# Patient Record
Sex: FEMALE | Race: WHITE | NOT HISPANIC OR LATINO | Employment: UNEMPLOYED | URBAN - METROPOLITAN AREA
[De-identification: names, ages, dates, MRNs, and addresses within clinical notes are randomized per-mention and may not be internally consistent; named-entity substitution may affect disease eponyms.]

---

## 2020-01-30 DIAGNOSIS — Z12.31 ENCOUNTER FOR SCREENING MAMMOGRAM FOR MALIGNANT NEOPLASM OF BREAST: Primary | ICD-10-CM

## 2020-02-05 ENCOUNTER — HOSPITAL ENCOUNTER (OUTPATIENT)
Dept: RADIOLOGY | Facility: HOSPITAL | Age: 40
Discharge: HOME | End: 2020-02-05
Attending: INTERNAL MEDICINE
Payer: MEDICAID

## 2020-02-05 DIAGNOSIS — Z12.31 ENCOUNTER FOR SCREENING MAMMOGRAM FOR MALIGNANT NEOPLASM OF BREAST: ICD-10-CM

## 2020-02-05 PROCEDURE — 77067 SCR MAMMO BI INCL CAD: CPT | Mod: TC,PO

## 2020-02-07 DIAGNOSIS — R92.8 ABNORMAL MAMMOGRAM: Primary | ICD-10-CM

## 2020-02-12 ENCOUNTER — HOSPITAL ENCOUNTER (OUTPATIENT)
Dept: RADIOLOGY | Facility: HOSPITAL | Age: 40
Discharge: HOME | End: 2020-02-12
Attending: INTERNAL MEDICINE
Payer: MEDICAID

## 2020-02-12 DIAGNOSIS — R92.8 ABNORMAL MAMMOGRAM: ICD-10-CM

## 2020-02-12 PROCEDURE — 76642 ULTRASOUND BREAST LIMITED: CPT | Mod: TC,50,PO

## 2020-06-08 DIAGNOSIS — M54.12 BRACHIAL NEURITIS: ICD-10-CM

## 2020-06-08 DIAGNOSIS — M54.12 RADICULOPATHY, CERVICAL REGION: Primary | ICD-10-CM

## 2020-06-10 ENCOUNTER — CLINICAL SUPPORT (OUTPATIENT)
Dept: REHABILITATION | Facility: HOSPITAL | Age: 40
End: 2020-06-10
Payer: MEDICAID

## 2020-06-10 DIAGNOSIS — M54.16 CHRONIC RADICULAR LUMBAR PAIN: Primary | ICD-10-CM

## 2020-06-10 DIAGNOSIS — G89.29 CHRONIC RADICULAR LUMBAR PAIN: Primary | ICD-10-CM

## 2020-06-10 DIAGNOSIS — M53.3 SI (SACROILIAC) JOINT DYSFUNCTION: ICD-10-CM

## 2020-06-10 DIAGNOSIS — R26.89 IMPAIRED GAIT AND MOBILITY: ICD-10-CM

## 2020-06-10 PROCEDURE — 97162 PT EVAL MOD COMPLEX 30 MIN: CPT

## 2020-06-10 NOTE — PLAN OF CARE
[facility] OUTPATIENT THERAPY AND WELLNESS  Physical Therapy Initial Evaluation    Date: 6/10/2020   Name: Benedicto South  Clinic Number: [id number]    Therapy Diagnosis:   Encounter Diagnoses   Name Primary?    Chronic radicular lumbar pain Yes    SI (sacroiliac) joint dysfunction     Impaired gait and mobility      Physician: Demetrio Ayala MD    Physician Orders: PT Eval and Treat   Medical Diagnosis from Referral: M54.16 Radiculopathy, lumbar region   Evaluation Date: 6/10/2020  Authorization Period Expiration: 6/8/2021  Plan of Care Expiration: 8/5/2020  Visit # / Visits authorized: 1/ 1 (awaiting authorization)  POC: 0/14    Time In: 9:15  Time Out: 10:05  Total Appointment Time (timed & untimed codes): 50 minutes    Precautions: Standard    Subjective   Date of onset: Four years ago my lower back started hurting which slowly progressed down the right leg. There was nothing that I can recall that set off the pain.    History of current condition - BENEDICTO reports: Over the years the pain down my leg has gotten worse to the point where I could not get out of bed for several days. As of now the only thing that calms down the pain in my leg is my gabapentin as I have found that the pain medication does not even touch it. The pain in my back has been constant since then, however I have found that the pain in my leg reduces if I lay on my side with a pillow in between my legs where I am able to keep my hips a little more even.        Medical History:   No past medical history on file.    Surgical History:   Benedicto South had Cervical laminectomy in 2016    Medications:   Benedicto currently is taking Gabapentin, Ibprophen 800mg    Allergies:   Review of patient's allergies indicates:  Allergies not on file     Imaging, MRI studies: Posterior Buldging discs at L2-L4    Prior Therapy: Therapy 4 years ago four years ago however it did not feel like it made much of a difference  Social History: Lives with her two  children in a one story home. Enjoys cooking  Occupation: Unemployed  Prior Level of Function: Able to ambulate, tolerate standing and perform daily tasks without difficulty and independently.  Current Level of Function: I am unable to lift things, standing or walking long distances. Leaning forwards over the sink gives me relief but I have to sit intermediate between washing dishes.   I have to really rely on a railing if I am going up and down stairs.    Pain:  Current 9/10, worst 10/10, best 5/10   Location: right back , lower legs and upper legs   Description: Aching, Sharp and Shooting  Aggravating Factors: Standing, Walking and Flexing  Easing Factors: pain medication    Pts goals: Just to feel better    Objective     Structural/Postural Inspection: Significant forward head and rounded shoulder posture with lumbar lordosis with unsupported sitting.     Palpation for Condition: Tenderness to palpation with a PA mobilization from L5 to L2, bilateral lumbar perispinal musculature, and right gluteus medius/ david    Repeated Movements:     Repeated Movements/Direction Specific Results   Standing Extension  Positive reproduction/ radicular symptoms of pain   Seated  Flexion  Reduction/ retraction of symptoms   Beginning Levels of pain:        Low Back pain: 8/10,   Thigh pain 3/10,  Calf Pain 3/10    Post Standing extension:        Low Back pain: 9/10,   Thigh Pain  5/10, Calf Pain    5/10    Post Seated Flexion:         Low Back pain: 7/10,   Thigh Pain 4/10,  Calf Pain  4/10    Lumbar Special Test:     Lumbar Special Test  Results Comments   Lumbar Distraction  Positive. Reduction of symptoms       SI Tests/Screen:    Sacroiliac Results Comments   SI Distraction * Positive.    Gaenslen's* Positive.    Supine to sit test  Positive.    FABERs Positive.        LE MMT Testing:  Assess at next treatment session     Muscle Length Tension Testing:     Lumbar/Hip/Knee Right  Left  Comments   Jose Test To be assessed next  session To be assessed    Monty's Test Positive  Negative Production of pain in the hip and knee     Hip Screen:  Assess at next treatment session  (ROM for ER and IR)       Hip Right Left   FADDIR Test Negative. Negative.   Scour Test Negative. Negative.     Sensation:Lack of light and deep touch sensation medial two toes RLE to proximal phalanx. All joint proprioception intact    Neuro Testing Right  Left Comments   SLR (Sciatic) Positive. Positive. Right leg positive at 35 degrees hip flexion  Left leg positive at 60 degrees hip flexion     Reflexes Positive Right  Positive Left Comments   Patellar (L3-L4) 2+ 2+    Achilles (S1) 2+ 2+      Outcome Measures:     Oswestry Disability Index: 31/50 = 62% Disability  Limited mostly by: Pain intensity, Unable to tolerate standing longer than 10 minutes, only doing light duties around the house, unable to tolerate sitting for longer than 1 hour          TREATMENT     Home Exercises and Patient Education Provided    Education provided:   - Provided with a simple exercise program listed in patient instructions.   - Instructed to perform exercises 2x a day to tolerance  - Educated on the POC and the positive findings during her evaluation. Instructed in the roll of the sacrum, pelvis and lumbar spine to provide stability during upright positions    Written Home Exercises Provided: yes.  Exercises were reviewed and BENEDICTO was able to demonstrate them prior to the end of the session.  BENEDICTO demonstrated good  understanding of the education provided.     See EMR under Patient Instructions for exercises provided 6/10/2020.    Assessment   Benedicto is a 39 y.o. female referred to outpatient Physical Therapy with a medical diagnosis of M54.16 Radiculopathy, lumbar region. Pt presents with right sided radicular symptoms that respond to flexion directional specific repeitions, sciatic nerve flossing and pelvic compression. In addition to her reported herniated lumbar discs she also  demonstrates SI dysfunction and sciatic nerve pain with increased muscle tension/ guarding throughout her lumbar perispinal and gluteus medius/ david. Monica is currently acutely inflamed throughout the lumbar spine and is unable to tolerate much overpressure or stress. Monica is currently limited 62% of her lumbar spine function which is limiting her ability to perform daily tasks such as standing long enough to wash dishes or light household tasks. Given her reduction of symptoms and positive findings on the above tests she would benefit from therapy.    Pt prognosis is Fair.   Pt will benefit from skilled outpatient Physical Therapy to address the deficits stated above and in the chart below, provide pt/family education, and to maximize pt's level of independence.     Plan of care discussed with patient: Yes  Pt's spiritual, cultural and educational needs considered and patient is agreeable to the plan of care and goals as stated below:     Anticipated Barriers for therapy: Long duration of current problem list, high levels of pain    Medical Necessity is demonstrated by the following  History  Co-morbidities and personal factors that may impact the plan of care Co-morbidities:   anxiety, difficulty sleeping and high BMI    Personal Factors:   social background  lifestyle  attitudes     moderate   Examination  Body Structures and Functions, activity limitations and participation restrictions that may impact the plan of care Body Regions:   back  lower extremities  trunk    Body Systems:    ROM  strength  gross coordinated movement  gait  transfers  motor control  motor learning    Participation Restrictions:   High levels of pain    Activity limitations:   Learning and applying knowledge  no deficits    General Tasks and Commands  no deficits    Communication  no deficits    Mobility  lifting and carrying objects  walking  using transportation (bus, train, plane, car)  driving (bike, car, motorcycle)    Self  care  washing oneself (bathing, drying, washing hands)  caring for body parts (brushing teeth, shaving, grooming)    Domestic Life  shopping  cooking  doing house work (cleaning house, washing dishes, laundry)  assisting others    Interactions/Relationships  no deficits    Life Areas  employment    Community and Social Life  community life  recreation and leisure         moderate   Clinical Presentation evolving clinical presentation with changing clinical characteristics moderate   Decision Making/ Complexity Score: moderate     Goals:  Short Term Goals: 4 weeks   1. Patient will verbalize a reduction of right radicular symptoms to the knee to improve sensation of the right leg during ambulation.  2. Patient will report a MARK score of 40% disability to improve her QoL with daily tasks.  3. Patient will tolerate standing for 30 minutes without increasing levels of pain to allow her to wash the dishes without having to take a rest break in between each.   4. Patient will tolerate sitting for 2 hours without an increase of pain to allow her to travel moderate distances without difficulty.    Long Term Goals: 8 weeks   1. Patient will report a MARK score of 15% disability to improve her QoL with daily tasks.  2. Patient will tolerate standing for 50 minutes without exacerbation of symptoms so she can walk around the grocery store without having to compensate by leaning on the grocery cart.  3. Patient will verbalized a reduction of right radicular symptoms to the mid thigh to improve her sensation of the right leg during ambulation.     Plan   Plan of care Certification: 6/10/2020 to 8/5/2020.    Outpatient Physical Therapy 2 times weekly for 4 weeks, followed by 1 time a week for 6 weeks to include the following interventions: Electrical Stimulation IFC, Manual Therapy, Moist Heat/ Ice, Neuromuscular Re-ed, Patient Education, Therapeutic Activites and Therapeutic Exercise.     Emily Choudhury, PT

## 2020-06-20 NOTE — PROGRESS NOTES
"[facility] OUTPATIENT  Physical Therapy Daily Treatment Note     Name: Benedicto South  Clinic Number: [id number]    Therapy Diagnosis: No diagnosis found.  Physician: No ref. provider found    Visit Date: 6/23/2020    Physician Orders: PT Eval and Treat   Medical Diagnosis from Referral: M54.16 Radiculopathy, lumbar region     Evaluation Date: 6/10/2020  Authorization Period Expiration: 6/8/2021  Plan of Care Expiration: 8/5/2020    Visit # / Visits authorized: 1/ 1 (awaiting authorization)  (Freq: Ev + 2w4,1w6)    POC: 0/14    Total Visit count: 2    PTA Visit: 0/5      Re-assessment due by: 7/10/2020      Time In: 0900  Time Out: 0950      Precautions: Standard      Subjective     Pt reports: She currently relates an 8/10 pain radiating pain to her R LE.  She adds that she unable to perform her PPT on HEP due to a "female issue" and an inability to contract her abdominals.        She was compliant with home exercise program.    Response to previous treatment: First visit following eval.  Functional change: Non-remarkable      Pain: 8/10  Location: right back , feet , lower legs and upper legs       Objective       BENEDICTO received therapeutic exercises to develop strength, endurance, ROM, flexibility and core stabilization for 42 minutes including:    · NuStep L2 x 5 min  · Sciatic nerve glides R LE 10 x   · DKTC with swissball 10 x 2  · LTR with swissball 10 x 2  · PPT with TrA and swissball 10 x 2  · SKTC in sitting 10 x   · Seated prayer stretch with swissball 3 positions 30 sec each.    ·         BENEDICTO received the following direct contact modalities after being cleared for contraindications:N/A    BENEDICTO received the following supervised modalities after being cleared for contradictions: IFC Electrical Stimulation:  Benedicto received IFC Electrical Stimulation for pain control applied to the R LB. Pt received stimulation with MHP for 9 minutes. Benedicto tolderated treatment well " without any adverse effects.        BENEDICTO received hot pack for as above minutes to as above.    BENEDICTO received cold pack for 0 minutes to N/A.      Home Exercises Provided and Patient Education Provided     Education provided:   - Patient was educated on adjusted HEP and educated on mode frequency reps, and sets as well as when to stop TE.  Patient verbalzied understanding, performed return demonstration and with no adverse affects noted nor verbalized.        Written Home Exercises Provided: Patient instructed to cont prior HEP. As adjusted with seated SKTC and DKTC  Exercises were reviewed and BENEDICTO was able to demonstrate them prior to the end of the session.  BENEDICTO demonstrated fair  understanding of the education provided.     See EMR under Media for exercises provided prior visit.    Assessment      Patient participated with PT to address her current deficits regarding pain, weakness and function.  Her HEP was adjusted for improved compliance and ability to perform.  She was able to aquilino her Rx this day with education, VC/TC for improved exs quality and performance and without c/o.  She is expected to improve andprogress with cont PT RX in order to strengthen her core and hip musculature in order to optimize her safe functional mob.      BENEDICTO is progressing well towards her goals.   Pt prognosis is Fair.     Pt will continue to benefit from skilled outpatient physical therapy to address the deficits listed in the problem list box on initial evaluation, provide pt/family education and to maximize pt's level of independence in the home and community environment.     Pt's spiritual, cultural and educational needs considered and pt agreeable to plan of care and goals.       Anticipated barriers to physical therapy: Long duration of current problem list, high levels of pain      Goals:     Short Term Goals: 4 weeks   1. Patient will verbalize a reduction of right radicular symptoms to the knee to improve sensation of  the right leg during ambulation.   In progress 6/23/2020  2. Patient will report a MARK score of 40% disability to improve her QoL with daily tasks.         In progress 6/23/2020  3. Patient will tolerate standing for 30 minutes without increasing levels of pain to allow her to wash the dishes without having to take a rest break in between each.   4. Patient will tolerate sitting for 2 hours without an increase of pain to allow her to travel moderate distances without difficulty.     Long Term Goals: 8 weeks   1. Patient will report a MARK score of 15% disability to improve her QoL with daily tasks.  2. Patient will tolerate standing for 50 minutes without exacerbation of symptoms so she can walk around the grocery store without having to compensate by leaning on the grocery cart.  3. Patient will verbalized a reduction of right radicular symptoms to the mid thigh to improve her sensation of the right leg during ambulation      Plan     Cont POC and follow up on updated HEP and progress as aquilino.          Patricio Lipscomb, PT

## 2020-06-23 ENCOUNTER — CLINICAL SUPPORT (OUTPATIENT)
Dept: REHABILITATION | Facility: HOSPITAL | Age: 40
End: 2020-06-23
Payer: MEDICAID

## 2020-06-23 DIAGNOSIS — M53.3 SI (SACROILIAC) JOINT DYSFUNCTION: ICD-10-CM

## 2020-06-23 DIAGNOSIS — G89.29 CHRONIC RADICULAR LUMBAR PAIN: Primary | ICD-10-CM

## 2020-06-23 DIAGNOSIS — R26.89 IMPAIRED GAIT AND MOBILITY: ICD-10-CM

## 2020-06-23 DIAGNOSIS — M54.16 CHRONIC RADICULAR LUMBAR PAIN: Primary | ICD-10-CM

## 2020-06-23 PROCEDURE — 97110 THERAPEUTIC EXERCISES: CPT

## 2020-06-25 ENCOUNTER — CLINICAL SUPPORT (OUTPATIENT)
Dept: REHABILITATION | Facility: HOSPITAL | Age: 40
End: 2020-06-25
Payer: MEDICAID

## 2020-06-25 DIAGNOSIS — M53.3 SI (SACROILIAC) JOINT DYSFUNCTION: ICD-10-CM

## 2020-06-25 DIAGNOSIS — M54.16 CHRONIC RADICULAR LUMBAR PAIN: ICD-10-CM

## 2020-06-25 DIAGNOSIS — R26.89 IMPAIRED GAIT AND MOBILITY: ICD-10-CM

## 2020-06-25 DIAGNOSIS — G89.29 CHRONIC RADICULAR LUMBAR PAIN: ICD-10-CM

## 2020-06-25 PROCEDURE — 97110 THERAPEUTIC EXERCISES: CPT | Mod: CQ

## 2020-06-25 NOTE — PROGRESS NOTES
[facility] OUTPATIENT  Physical Therapy Daily Treatment Note     Name: Benedicto South  Clinic Number: [id number]    Therapy Diagnosis:   Encounter Diagnoses   Name Primary?    Chronic radicular lumbar pain     SI (sacroiliac) joint dysfunction     Impaired gait and mobility      Physician: No ref. provider found    Visit Date: 6/25/2020    Physician Orders: PT Eval and Treat   Medical Diagnosis from Referral: M54.16 Radiculopathy, lumbar region     Evaluation Date: 6/10/2020  Authorization Period Expiration: 6/8/2021  Plan of Care Expiration: 8/5/2020    Visit # / Visits authorized: 3/ 13  (Freq: Ev + 2w4,1w6)    POC: 3/14    Total Visit count: 3    PTA Visit: 1/5      Re-assessment due by: 7/10/2020      Time In: 0915  Time Out: 1015      Precautions: Standard      Subjective     Pt reports: current pain level 5-6/10 with constant numbness in R toes.        She was not compliant with home exercise program.    Response to previous treatment: no soreness   Functional change: N/a      Pain: 5/10  Location: right back , feet , lower legs and upper legs       Objective       BENEDICTO received therapeutic exercises to develop strength, endurance, ROM, flexibility and core stabilization for 42 minutes including:    · NuStep L2 x 5 min  · Hamstring stretch 30 x 3   · Piriformis stretch seated 30 x 3   · Sciatic nerve glides B LE 10 x 2   · DKTC with swissball 10 x 2  · LTR with swissball 10 x 2  · PPT with TrA and swissball 10 x 2  · SKTC in sitting 10 x   · Seated prayer stretch with swissball 3 positions 30 sec each.    ·         BENEDICTO received the following direct contact modalities after being cleared for contraindications:N/A    BENEDICTO received the following supervised modalities after being cleared for contradictions: Mechanical Traction:  Benedicto received intermittent mechanical traction to the lumbar spine at a force of 120 pounds hold with 60 pound rest for a total of 15 minutes.  Patient tolerated treatment well without any adverse effects.      BENEDICTO received the following supervised modalities after being cleared for contradictions: IFC Electrical Stimulation:  Benedicto received IFC Electrical Stimulation for pain control applied to the R LB. Pt received stimulation with MHP for  minutes. Benedicto tolderated treatment well without any adverse effects.        BENEDICTO received hot pack for as above minutes to as above.    BENEDICTO received cold pack for 0 minutes to N/A.      Home Exercises Provided and Patient Education Provided     Education provided:   - Patient was educated on adjusted HEP and educated on mode frequency reps, and sets as well as when to stop TE.  Patient verbalzied understanding, performed return demonstration and with no adverse affects noted nor verbalized.        Written Home Exercises Provided: Patient instructed to cont prior HEP. As adjusted with seated SKTC and DKTC  Exercises were reviewed and BENEDICTO was able to demonstrate them prior to the end of the session.  BENEDICTO demonstrated fair  understanding of the education provided.     See EMR under Media for exercises provided prior visit.    Assessment      Patient was able to perform all recommended TE without increase of pain or onset of radicular symptoms. She tolerated the addition of HHS and piriformis stretch without incident. Mechanical traction was added to treatment today to address sciatica symptoms. She responded well to traction with reports of improved ability to move her legs following traction.       BENEDICTO is progressing well towards her goals.   Pt prognosis is Fair.     Pt will continue to benefit from skilled outpatient physical therapy to address the deficits listed in the problem list box on initial evaluation, provide pt/family education and to maximize pt's level of independence in the home and community environment.     Pt's spiritual, cultural and educational needs considered and pt agreeable to plan of care and  goals.       Anticipated barriers to physical therapy: Long duration of current problem list, high levels of pain      Goals:     Short Term Goals: 4 weeks   1. Patient will verbalize a reduction of right radicular symptoms to the knee to improve sensation of the right leg during ambulation.   In progress 6/25/2020  2. Patient will report a MARK score of 40% disability to improve her QoL with daily tasks.         In progress 6/25/2020  3. Patient will tolerate standing for 30 minutes without increasing levels of pain to allow her to wash the dishes without having to take a rest break in between each.   4. Patient will tolerate sitting for 2 hours without an increase of pain to allow her to travel moderate distances without difficulty.     Long Term Goals: 8 weeks   1. Patient will report a MARK score of 15% disability to improve her QoL with daily tasks.  2. Patient will tolerate standing for 50 minutes without exacerbation of symptoms so she can walk around the grocery store without having to compensate by leaning on the grocery cart.  3. Patient will verbalized a reduction of right radicular symptoms to the mid thigh to improve her sensation of the right leg during ambulation      Plan     Cont POC and follow up on updated HEP and progress as aquilino.          Kayla Tomas, PTA

## 2020-06-27 NOTE — PROGRESS NOTES
"                               [facility] OUTPATIENT  Physical Therapy Daily Treatment Note     Name: Benedicto South  Clinic Number: [id number]    Therapy Diagnosis:   No diagnosis found.  Physician: Demetrio Ayala MD    Visit Date: 6/30/2020    Physician Orders: PT Eval and Treat   Medical Diagnosis from Referral: M54.16 Radiculopathy, lumbar region     Evaluation Date: 6/10/2020  Authorization Period Expiration: 6/8/2021  Plan of Care Expiration: 8/5/2020    Visit # / Visits authorized: 4/ 13  (Freq: Ev + 2w4,1w6)    POC: 4/14    Total Visit count: 4    PTA Visit: 0/5      Re-assessment due by: 7/10/2020      Time In: 0955  Time Out: 1040      Precautions: Standard      Subjective     Pt reports: current pain level 4/10 with constant numbness in R LE to toes, "its not as painful today" "I think the traction helped, I liked it."      She was not compliant with home exercise program.    Response to previous treatment: no soreness   Functional change: N/a      Pain: 5/10  Location: right back , feet , lower legs and upper legs       Objective       BENEDICTO received therapeutic exercises to develop strength, endurance, ROM, flexibility and core stabilization for 32 minutes including:    · NuStep L2 x 5 min - held  · Hamstring stretch 30 x 3   · Piriformis stretch seated 30 x 3   · Sciatic nerve glides B LE 10 x 2   · DKTC with swissball 10 x 2  · LTR with swissball 10 x 2  · PPT with TrA and swissball 10 x 2 held  · SKTC in sitting 10 x held  · Seated prayer stretch with swissball 3 positions 30 sec each.  -held  ·         BENEDICTO received the following direct contact modalities after being cleared for contraindications:N/A    BENEDICTO received the following supervised modalities after being cleared for contradictions: Mechanical Traction:  Benedicto received intermittent mechanical traction to the lumbar spine at a force of 120 pounds hold for 30 seconds with 70 pound rest for 10 sec. for a total of 15 minutes. " Patient tolerated treatment well without any adverse effects.  Patient related a pain reduction to a 1/10 following traction.        BENEDICTO received the following supervised modalities after being cleared for contradictions: IFC Electrical Stimulation:  Benedicto received IFC Electrical Stimulation for pain control applied to the R LB. Pt received stimulation with MHP for  0 minutes. Benedicto tolderated treatment well without any adverse effects.        BENEDICTO received hot pack for as above minutes to as above.    BENEDICTO received cold pack for 0 minutes to N/A.      Home Exercises Provided and Patient Education Provided     Education provided:   - Patient wasi ssued and educated on HEP and educated on mode frequency reps, and sets as well as when to stop TE.  Patient verbalzied understanding, performed return demonstration and with no adverse affects noted nor verbalized.        Written Home Exercises Provided: Issued updated HEP.   Exercises were reviewed and BENEDICTO was able to demonstrate them prior to the end of the session.  BENEDICTO demonstrated fair  understanding of the education provided.     See EMR under Media for exercises provided 6/30/2020.    Assessment     Patient participated with PT to address her deficits including her radicular pain.  She was able to aquilino her TE without c/o but did require VC/TC for improved exs quality and performance with with noted nn stretches.  She received lumbar mechanical traction as noted and responded well with a reduction in pain.  She was issued HEP as noted and is expected to improve and progress to her established goals with cont PT RX.         BENEDICTO is progressing well towards her goals.   Pt prognosis is Fair.     Pt will continue to benefit from skilled outpatient physical therapy to address the deficits listed in the problem list box on initial evaluation, provide pt/family education and to maximize pt's level of independence in the home and community environment.     Pt's spiritual,  cultural and educational needs considered and pt agreeable to plan of care and goals.       Anticipated barriers to physical therapy: Long duration of current problem list, high levels of pain      Goals:     Short Term Goals: 4 weeks   1. Patient will verbalize a reduction of right radicular symptoms to the knee to improve sensation of the right leg during ambulation.   In progress 6/27/2020  2. Patient will report a MARK score of 40% disability to improve her QoL with daily tasks.         In progress 6/27/2020  3. Patient will tolerate standing for 30 minutes without increasing levels of pain to allow her to wash the dishes without having to take a rest break in between each.   4. Patient will tolerate sitting for 2 hours without an increase of pain to allow her to travel moderate distances without difficulty.     Long Term Goals: 8 weeks   1. Patient will report a MARK score of 15% disability to improve her QoL with daily tasks.  2. Patient will tolerate standing for 50 minutes without exacerbation of symptoms so she can walk around the grocery store without having to compensate by leaning on the grocery cart.  3. Patient will verbalized a reduction of right radicular symptoms to the mid thigh to improve her sensation of the right leg during ambulation      Plan       Cont POC, reinforce HEP and patient is to have an endometrial ablation and will have a short interruption in her Rx schedule.          Patricio Lipscomb, PT

## 2020-06-30 ENCOUNTER — DOCUMENTATION ONLY (OUTPATIENT)
Dept: REHABILITATION | Facility: HOSPITAL | Age: 40
End: 2020-06-30

## 2020-06-30 ENCOUNTER — CLINICAL SUPPORT (OUTPATIENT)
Dept: REHABILITATION | Facility: HOSPITAL | Age: 40
End: 2020-06-30
Payer: MEDICAID

## 2020-06-30 DIAGNOSIS — R26.89 IMPAIRED GAIT AND MOBILITY: ICD-10-CM

## 2020-06-30 DIAGNOSIS — M53.3 SI (SACROILIAC) JOINT DYSFUNCTION: ICD-10-CM

## 2020-06-30 DIAGNOSIS — G89.29 CHRONIC RADICULAR LUMBAR PAIN: Primary | ICD-10-CM

## 2020-06-30 DIAGNOSIS — M54.16 CHRONIC RADICULAR LUMBAR PAIN: Primary | ICD-10-CM

## 2020-06-30 PROCEDURE — 97110 THERAPEUTIC EXERCISES: CPT

## 2020-06-30 PROCEDURE — 97012 MECHANICAL TRACTION THERAPY: CPT

## 2020-07-16 ENCOUNTER — CLINICAL SUPPORT (OUTPATIENT)
Dept: REHABILITATION | Facility: HOSPITAL | Age: 40
End: 2020-07-16
Payer: MEDICAID

## 2020-07-16 DIAGNOSIS — M53.3 SI (SACROILIAC) JOINT DYSFUNCTION: ICD-10-CM

## 2020-07-16 DIAGNOSIS — G89.29 CHRONIC RADICULAR LUMBAR PAIN: Primary | ICD-10-CM

## 2020-07-16 DIAGNOSIS — M54.16 CHRONIC RADICULAR LUMBAR PAIN: Primary | ICD-10-CM

## 2020-07-16 DIAGNOSIS — R26.89 IMPAIRED GAIT AND MOBILITY: ICD-10-CM

## 2020-07-16 PROCEDURE — 97110 THERAPEUTIC EXERCISES: CPT

## 2020-07-16 NOTE — PROGRESS NOTES
[facility] OUTPATIENT  Physical Therapy Daily Treatment Note/ Reassessment     Name: Benedicto South  Clinic Number: [id number]    Therapy Diagnosis:   No diagnosis found.  Physician: Demetrio Ayala MD    Visit Date: 7/16/2020    Physician Orders: PT Eval and Treat   Medical Diagnosis from Referral: M54.16 Radiculopathy, lumbar region     Evaluation Date: 6/10/2020  Authorization Period Expiration: 6/8/2021  Plan of Care Expiration: 8/5/2020    Visit # / Visits authorized: 5/ 13  (Freq: Ev + 2w4,1w6)      Total Visit count: 5    PTA Visit: 0/5      Re-assessment due by: 8/16/2020      Time In: 1045  Time Out: 1130      Precautions: Standard      Subjective     Pt reports: current pain level 8/10 to her R LE to toes.  She adds that she had her procedure last week and has not performed her HEP in about a week.  Moreover, she related that she felt that before her procedure she had made improvements but after stopping she may have returned toward her initial symptoms.  She adds that she can perform standing for about 10 minutes       She was not compliant with home exercise program.    Response to previous treatment: no soreness   Functional change: N/a      Pain: 5/10  Location: right back , feet , lower legs and upper legs       Objective     BENEDICTO received therapeutic exercises to develop strength, endurance, ROM, flexibility and core stabilization for 40 minutes including:    · NuStep L3 x 6 min   · Hamstring stretch 60 sec x 2  · Piriformis stretch seated 60 sec x 2   · Sciatic nerve glides B LE 10 x 2   · DKTC with swissball 10 x 2  · LTR with swissball 10 x 2 (one set today)  · PPT with TrA and swissball 10 x 2 held  · SKTC in sitting 10 x held  · Seated prayer stretch with swissball 3 positions 30 sec each.  -held  ·       Functional Test:     Mod I: 39/50 = 78%      BENEDICTO received the following direct contact modalities after being cleared for  contraindications:N/A    BENEDICTO received the following supervised modalities after being cleared for contradictions: Mechanical Traction:  Benedicto received intermittent mechanical traction to the lumbar spine at a force of 120 pounds hold for 30 seconds with 70 pound rest for 10 sec. for a total of 0 minutes. Patient tolerated treatment well without any adverse effects.  Patient related a pain reduction to a 1/10 following traction.        BENEDICTO received the following supervised modalities after being cleared for contradictions: IFC Electrical Stimulation:  Benedicto received IFC Electrical Stimulation for pain control applied to the R LB. Pt received stimulation with MHP for  0 minutes. Benedicto tolderated treatment well without any adverse effects.        BENEDICTO received hot pack for as above minutes to as above.    BENEDICTO received cold pack for 0 minutes to N/A.      Home Exercises Provided and Patient Education Provided     Education provided:   - Patient was educated on gradual reintegration of HEP as well as when to stop TE.  Patient verbalzied understanding, performed return demonstration and with no adverse affects noted nor verbalized.        Written Home Exercises Provided: Issued updated HEP.   Exercises were reviewed and BENEDICTO was able to demonstrate them prior to the end of the session.  BENEDICTO demonstrated fair  understanding of the education provided.     See EMR under Media for exercises provided 6/30/2020.    ReAssessment     Patient has participated with PT to address her pain, and function.  She had initially had improvements with pain mitigation and improving function but most recently had to have a procedure and has migrated toward her original state after about a week of inactivity.  She currently relates a disability of 78% as per the MOD I and with STG 1, 2, and 4 in progress.  Patient is expected to improve and progress with cont PT Rx to address her core and lumbar stabilization in order to mitigate and  centralize her pain.  She is expected to progress to her established goals with cont PT Rx.           BENEDICTO is progressing well towards her goals.   Pt prognosis is Fair.     Pt will continue to benefit from skilled outpatient physical therapy to address the deficits listed in the problem list box on initial evaluation, provide pt/family education and to maximize pt's level of independence in the home and community environment.     Pt's spiritual, cultural and educational needs considered and pt agreeable to plan of care and goals.       Anticipated barriers to physical therapy: Long duration of current problem list, high levels of pain      Goals:     Short Term Goals: 4 weeks   1. Patient will verbalize a reduction of right radicular symptoms to the knee to improve sensation of the right leg during ambulation.   In progress 7/16/2020  2. Patient will report a MARK score of 40% disability to improve her QoL with daily tasks.         In progress 7/16/2020  3. Patient will tolerate standing for 30 minutes without increasing levels of pain to allow her to wash the dishes without having to take a rest break in between each.   4. Patient will tolerate sitting for 2 hours without an increase of pain to allow her to travel moderate distances without difficulty.  In progress 7/16/2020     Long Term Goals: 8 weeks   1. Patient will report a MARK score of 15% disability to improve her QoL with daily tasks.  2. Patient will tolerate standing for 50 minutes without exacerbation of symptoms so she can walk around the grocery store without having to compensate by leaning on the grocery cart.  3. Patient will verbalized a reduction of right radicular symptoms to the mid thigh to improve her sensation of the right leg during ambulation      Plan       Cont POC, reinforce return to HEP and progress as aquilino.          Patricio Lipscomb, PT

## 2020-07-22 NOTE — PROGRESS NOTES
[facility] OUTPATIENT  Physical Therapy Daily Treatment Note     Name: Benedicto South  Clinic Number: [id number]    Therapy Diagnosis:   Encounter Diagnoses   Name Primary?    Chronic radicular lumbar pain Yes    SI (sacroiliac) joint dysfunction     Impaired gait and mobility      Physician: Demetrio Ayala MD    Visit Date: 7/23/2020    Physician Orders: PT Eval and Treat   Medical Diagnosis from Referral: M54.16 Radiculopathy, lumbar region     Evaluation Date: 6/10/2020  Authorization Period Expiration: 6/8/2021  Plan of Care Expiration: 8/5/2020    Visit # / Visits authorized: 6/ 13  (Freq: Ev + 2w4,1w6)      Total Visit count: 6    PTA Visit: 0/5      Re-assessment due by: 8/16/2020      Time In: 1045  Time Out: 1135      Precautions: Standard      Subjective     Pt reports: current discomfort/pain level 5/10 to her R LE   She relates she is still a little tender from her procedure and that she has follow up with her MD today.      She was not compliant with home exercise program.    Response to previous treatment: no soreness   Functional change: N/A      Pain: 5/10  Location: right back , feet , lower legs and upper legs       Objective     BENEDICTO received therapeutic exercises to develop strength, endurance, ROM, flexibility and core stabilization for 42 minutes including:    · NuStep L3 x 6 min   · Hamstring stretch 60 sec x 2  · Piriformis stretch seated 60 sec x 2   · Sciatic nerve glides B LE 10 x 2   · DKTC with swissball 10 x 2  · LTR with swissball 10 x 2   · PPT with TrA and swissball 10 x 2  · SKTC in sitting 10 x held  · Seated prayer stretch with swissball 3 positions 30 sec each.   ·         BENEDICTO received the following direct contact modalities after being cleared for contraindications:N/A    BENEDICTO received the following supervised modalities after being cleared for contradictions: Mechanical Traction:  Benedicto received intermittent mechanical traction  to the lumbar spine at a force of 120 pounds hold for 30 seconds with 70 pound rest for 10 sec. for a total of 0 minutes. Patient tolerated treatment well without any adverse effects.  Patient related a pain reduction to a 1/10 following traction.        BENEDICTO received the following supervised modalities after being cleared for contradictions: IFC Electrical Stimulation:  Benedicto received IFC Electrical Stimulation for pain control applied to the R LB. Pt received stimulation with MHP for  0 minutes. Benedicto tolderated treatment well without any adverse effects.        BENEDICTO received hot pack for as above minutes to as above.    BENEDICTO received cold pack for 0 minutes to N/A.      Home Exercises Provided and Patient Education Provided     Education provided:   - Patient was educated on gradual reintegration of HEP as well as when to stop TE.  Patient verbalzied understanding, performed return demonstration and with no adverse affects noted nor verbalized.        Written Home Exercises Provided: Issued updated HEP.   Exercises were reviewed and BENEDICTO was able to demonstrate them prior to the end of the session.  BENEDICTO demonstrated fair  understanding of the education provided.     See EMR under Media for exercises provided 6/30/2020.    Assessment     Patient has participated with PT to address her pain, ROM/flexibility core strength activity aquilino and functional mob.  She was able to aquilino her Rx this day better than last time but cont to have c/o of abdominal discomfort post endometrial ablation.  She required cueing for improved technique and core activation.  Moreover she did require several recovery periods due to fatigue but was able to recover appropriately.  She is expected to improve and progress with cont PT Rx in order to strengthen her core.         BENEDICTO is progressing well towards her goals.   Pt prognosis is Fair.     Pt will continue to benefit from skilled outpatient physical therapy to address the deficits listed in  the problem list box on initial evaluation, provide pt/family education and to maximize pt's level of independence in the home and community environment.     Pt's spiritual, cultural and educational needs considered and pt agreeable to plan of care and goals.       Anticipated barriers to physical therapy: Long duration of current problem list, high levels of pain      Goals:     Short Term Goals: 4 weeks   1. Patient will verbalize a reduction of right radicular symptoms to the knee to improve sensation of the right leg during ambulation.   In progress 7/23/2020  2. Patient will report a MARK score of 40% disability to improve her QoL with daily tasks.         In progress 7/23/2020  3. Patient will tolerate standing for 30 minutes without increasing levels of pain to allow her to wash the dishes without having to take a rest break in between each.   4. Patient will tolerate sitting for 2 hours without an increase of pain to allow her to travel moderate distances without difficulty.  In progress 7/23/2020     Long Term Goals: 8 weeks   1. Patient will report a MARK score of 15% disability to improve her QoL with daily tasks.  2. Patient will tolerate standing for 50 minutes without exacerbation of symptoms so she can walk around the grocery store without having to compensate by leaning on the grocery cart.  3. Patient will verbalized a reduction of right radicular symptoms to the mid thigh to improve her sensation of the right leg during ambulation      Plan       Cont POC, progress as aquilino.          Patricio Lipscomb, PT

## 2020-07-23 ENCOUNTER — CLINICAL SUPPORT (OUTPATIENT)
Dept: REHABILITATION | Facility: HOSPITAL | Age: 40
End: 2020-07-23
Payer: MEDICAID

## 2020-07-23 DIAGNOSIS — M53.3 SI (SACROILIAC) JOINT DYSFUNCTION: ICD-10-CM

## 2020-07-23 DIAGNOSIS — M54.16 CHRONIC RADICULAR LUMBAR PAIN: Primary | ICD-10-CM

## 2020-07-23 DIAGNOSIS — G89.29 CHRONIC RADICULAR LUMBAR PAIN: Primary | ICD-10-CM

## 2020-07-23 DIAGNOSIS — R26.89 IMPAIRED GAIT AND MOBILITY: ICD-10-CM

## 2020-07-23 PROCEDURE — 97110 THERAPEUTIC EXERCISES: CPT

## 2020-07-30 ENCOUNTER — CLINICAL SUPPORT (OUTPATIENT)
Dept: REHABILITATION | Facility: HOSPITAL | Age: 40
End: 2020-07-30
Payer: MEDICAID

## 2020-07-30 DIAGNOSIS — G89.29 CHRONIC RADICULAR LUMBAR PAIN: ICD-10-CM

## 2020-07-30 DIAGNOSIS — M54.16 CHRONIC RADICULAR LUMBAR PAIN: ICD-10-CM

## 2020-07-30 DIAGNOSIS — R26.89 IMPAIRED GAIT AND MOBILITY: ICD-10-CM

## 2020-07-30 DIAGNOSIS — M53.3 SI (SACROILIAC) JOINT DYSFUNCTION: ICD-10-CM

## 2020-07-30 PROCEDURE — 97110 THERAPEUTIC EXERCISES: CPT

## 2020-07-30 NOTE — PROGRESS NOTES
[facility] OUTPATIENT  Physical Therapy Daily Treatment Note     Name: Benedicto South  Clinic Number: [id number]    Therapy Diagnosis:   No diagnosis found.  Physician: Demetrio Ayala MD    Visit Date: 7/30/2020    Physician Orders: PT Eval and Treat   Medical Diagnosis from Referral: M54.16 Radiculopathy, lumbar region     Evaluation Date: 6/10/2020  Authorization Period Expiration: 6/8/2021  Plan of Care Expiration: 8/5/2020    Visit # / Visits authorized: 7/ 13  (Freq: Ev + 2w4,1w6)      Total Visit count: 7    PTA Visit: 0/5      Re-assessment due by: 8/16/2020      Time In: 0906  Time Out: 0945      Precautions: Standard      Subjective     Pt reports: I am hurting pretty bad today 8/10. I have not been performing my HEP too much. Pt continues to report discomfort in abdomen from recent procedure.      She was not compliant with home exercise program.    Response to previous treatment: no soreness   Functional change: N/A      Pain: 8/10  Location: right back , feet , lower legs and upper legs       Objective     BENEDICTO received therapeutic exercises to develop strength, endurance, ROM, flexibility and core stabilization for 38 minutes including:    · NuStep L3 x 6 min   · Hamstring stretch 60 sec x 2  · Piriformis stretch seated 60 sec x 2   · Sciatic nerve glides B LE 10 x 2   · DKTC with swissball 10 x 2  · LTR with swissball 10 x 2   · PPT with TrA and swissball DKTC 10 x 2  · SKTC in sitting 10 x held  · Seated prayer stretch with swissball 3 positions 30 sec each.   · + Supine glute sets 1x10 with 10s hold   · Attempted supine bridges, but pt c/o discomfort in abdomen from recent procedure and unable to tolerate at this time.     Pt reports no change in symptoms at end of treatment.     BENEDICTO received the following direct contact modalities after being cleared for contraindications:N/A    BENEDICTO received the following supervised modalities after being cleared for  contradictions: Mechanical Traction:  Benedicto received intermittent mechanical traction to the lumbar spine at a force of 120 pounds hold for 30 seconds with 70 pound rest for 10 sec. for a total of 0 minutes. Patient tolerated treatment well without any adverse effects.  Patient related a pain reduction to a 1/10 following traction.        BENEDICTO received the following supervised modalities after being cleared for contradictions: IFC Electrical Stimulation:  Benedicto received IFC Electrical Stimulation for pain control applied to the R LB. Pt received stimulation with MHP for  0 minutes. Benedicto tolderated treatment well without any adverse effects.        BENEDICTO received hot pack for as above minutes to as above.    BENEDICTO received cold pack for 0 minutes to N/A.      Home Exercises Provided and Patient Education Provided     Education provided:   - Patient was educated on gradual reintegration of HEP as well as when to stop TE.  Patient verbalzied understanding, performed return demonstration and with no adverse affects noted nor verbalized.      - Importance of performing HEP to improve symptoms and overall functional mobility.     Written Home Exercises Provided: Issued updated HEP.   Exercises were reviewed and BENEDICTO was able to demonstrate them prior to the end of the session.  BENEDICTO demonstrated fair  understanding of the education provided.     See EMR under Media for exercises provided 6/30/2020.    Assessment     Pt tolerated treatment well with no change in symptoms. Pt demonstrates difficulty with posterior pelvic tilt, Tra activation, and continues with mild discomfort in abdomen from recent procedure limiting her ability to progress therex at this time. Pt educated on importance of performing HEP to improve symptoms and overall functional mobility. Pt verbalizes understanding.        BENEDICTO is progressing well towards her goals.   Pt prognosis is Fair.     Pt will continue to benefit from skilled outpatient physical  therapy to address the deficits listed in the problem list box on initial evaluation, provide pt/family education and to maximize pt's level of independence in the home and community environment.     Pt's spiritual, cultural and educational needs considered and pt agreeable to plan of care and goals.       Anticipated barriers to physical therapy: Long duration of current problem list, high levels of pain      Goals:     Short Term Goals: 4 weeks   1. Patient will verbalize a reduction of right radicular symptoms to the knee to improve sensation of the right leg during ambulation.   In progress 7/30/2020  2. Patient will report a MARK score of 40% disability to improve her QoL with daily tasks.         In progress 7/30/2020  3. Patient will tolerate standing for 30 minutes without increasing levels of pain to allow her to wash the dishes without having to take a rest break in between each.   4. Patient will tolerate sitting for 2 hours without an increase of pain to allow her to travel moderate distances without difficulty.  In progress 7/30/2020     Long Term Goals: 8 weeks   1. Patient will report a MARK score of 15% disability to improve her QoL with daily tasks.  2. Patient will tolerate standing for 50 minutes without exacerbation of symptoms so she can walk around the grocery store without having to compensate by leaning on the grocery cart.  3. Patient will verbalized a reduction of right radicular symptoms to the mid thigh to improve her sensation of the right leg during ambulation      Plan       Cont POC, progress as aquilino.          Radu Campbell, PT

## 2020-08-06 ENCOUNTER — CLINICAL SUPPORT (OUTPATIENT)
Dept: REHABILITATION | Facility: HOSPITAL | Age: 40
End: 2020-08-06
Payer: MEDICAID

## 2020-08-06 DIAGNOSIS — G89.29 CHRONIC RADICULAR LUMBAR PAIN: Primary | ICD-10-CM

## 2020-08-06 DIAGNOSIS — R26.89 IMPAIRED GAIT AND MOBILITY: ICD-10-CM

## 2020-08-06 DIAGNOSIS — M54.16 CHRONIC RADICULAR LUMBAR PAIN: Primary | ICD-10-CM

## 2020-08-06 DIAGNOSIS — M53.3 SI (SACROILIAC) JOINT DYSFUNCTION: ICD-10-CM

## 2020-08-06 PROCEDURE — 97110 THERAPEUTIC EXERCISES: CPT

## 2020-08-06 NOTE — PROGRESS NOTES
[facility] OUT[facility]  Physical Therapy Daily Treatment Note     Name: Benedicto South  Clinic Number: [id number]    Therapy Diagnosis:   No diagnosis found.  Physician: Demetrio Ayala MD    Visit Date: 8/6/2020    Physician Orders: PT Eval and Treat   Medical Diagnosis from Referral: M54.16 Radiculopathy, lumbar region     Evaluation Date: 6/10/2020  Authorization Period Expiration: 6/8/2021  Plan of Care Expiration: 8/5/2020    Visit # / Visits authorized: 8/ 13  (Freq: Ev + 2w4,1w6)      Total Visit count: 8    PTA Visit: 0/5      Re-assessment due by: 8/16/2020      Time In: 1045  Time Out: 1140      Precautions: Standard      Subjective     Pt reports: She has an 8/10 pain today and relates she has not been consistent with her HEP.       She was not compliant with home exercise program.    Response to previous treatment: no soreness   Functional change: N/A      Pain: 8/10  Location: right back , feet , lower legs and upper legs       Objective     BENEDICTO received therapeutic exercises to develop strength, endurance, ROM, flexibility and core stabilization for 38 minutes including:    · NuStep L3 x 6 min   · Hamstring stretch 60 sec x 2  · Piriformis stretch seated 60 sec x 2   · Sciatic nerve glides B LE 10 x 2   · DKTC with swissball 10 x 2  · LTR with swissball 10 x 2   · PPT with TrA and swissball DKTC 10 x 2 held  · SKTC in sitting 10 x held  · Seated prayer stretch with swissball 3 positions 30 sec each  · + Supine glute sets 1x10 with 10s hold - held  · Attempted supine bridges, but pt c/o discomfort in abdomen from recent procedure and unable to tolerate at this time. held    Pt reports no change in symptoms at end of treatment.     BENEDICTO received the following direct contact modalities after being cleared for contraindications:N/A    BENEDICTO received the following supervised modalities after being cleared for contradictions: Mechanical Traction:  Benedicto received  intermittent mechanical traction to the lumbar spine at a force of 120 pounds hold for 30 seconds with 70 pound rest for 10 sec. for a total of 15 minutes. Patient tolerated treatment well without any adverse effects.  Patient related a pain reduction to a 1/10 following traction.        BENEDICTO received the following supervised modalities after being cleared for contradictions: IFC Electrical Stimulation:  Benedicto received IFC Electrical Stimulation for pain control applied to the R LB. Pt received stimulation with MHP for  0 minutes. Benedicto tolderated treatment well without any adverse effects.        BENEDICTO received hot pack for as above minutes to as above.    BENEDICTO received cold pack for 0 minutes to N/A.      Home Exercises Provided and Patient Education Provided     Education provided:   - Patient was educated on gradual reintegration of HEP as well as when to stop TE.  Patient verbalzied understanding, performed return demonstration and with no adverse affects noted nor verbalized.      - Importance of performing HEP to improve symptoms and overall functional mobility.     Written Home Exercises Provided: Issued updated HEP.   Exercises were reviewed and BENEDICTO was able to demonstrate them prior to the end of the session.  BENEDICTO demonstrated fair  understanding of the education provided.     See EMR under Media for exercises provided 6/30/2020.    Assessment     Patient participated with PT this day but entered PT with increased pain.  She was able to aquilino her noted Rx but did have increased recovery periods as her pain was an issue this session and limited her ability to perform her entire established program.  She did require cueing for improved exs quality and performance and to perform exs without any increase in pain.  Patient also received mechanical traction and responded well with reports of decreased pain to 5/10.  She was also educated on compliance with her HEP in order to maximize her benefit with PT.  She is  expected to improve with cont Rx in order to obtain her goals         BENEDICTO is progressing well towards her goals.   Pt prognosis is Fair.     Pt will continue to benefit from skilled outpatient physical therapy to address the deficits listed in the problem list box on initial evaluation, provide pt/family education and to maximize pt's level of independence in the home and community environment.     Pt's spiritual, cultural and educational needs considered and pt agreeable to plan of care and goals.       Anticipated barriers to physical therapy: Long duration of current problem list, high levels of pain      Goals:     Short Term Goals: 4 weeks   1. Patient will verbalize a reduction of right radicular symptoms to the knee to improve sensation of the right leg during ambulation.   In progress 8/6/2020  2. Patient will report a MARK score of 40% disability to improve her QoL with daily tasks.         In progress 8/6/2020  3. Patient will tolerate standing for 30 minutes without increasing levels of pain to allow her to wash the dishes without having to take a rest break in between each.   4. Patient will tolerate sitting for 2 hours without an increase of pain to allow her to travel moderate distances without difficulty.  In progress 8/6/2020     Long Term Goals: 8 weeks   1. Patient will report a MARK score of 15% disability to improve her QoL with daily tasks.  2. Patient will tolerate standing for 50 minutes without exacerbation of symptoms so she can walk around the grocery store without having to compensate by leaning on the grocery cart.  3. Patient will verbalized a reduction of right radicular symptoms to the mid thigh to improve her sensation of the right leg during ambulation      Plan       Cont POC, progress as aquilnio.          Patricio Lipscomb, PT

## 2020-08-11 ENCOUNTER — CLINICAL SUPPORT (OUTPATIENT)
Dept: REHABILITATION | Facility: HOSPITAL | Age: 40
End: 2020-08-11
Payer: MEDICAID

## 2020-08-11 ENCOUNTER — DOCUMENTATION ONLY (OUTPATIENT)
Dept: REHABILITATION | Facility: HOSPITAL | Age: 40
End: 2020-08-11

## 2020-08-11 DIAGNOSIS — G89.29 CHRONIC RADICULAR LUMBAR PAIN: ICD-10-CM

## 2020-08-11 DIAGNOSIS — M53.3 SI (SACROILIAC) JOINT DYSFUNCTION: ICD-10-CM

## 2020-08-11 DIAGNOSIS — R26.89 IMPAIRED GAIT AND MOBILITY: ICD-10-CM

## 2020-08-11 DIAGNOSIS — M54.16 CHRONIC RADICULAR LUMBAR PAIN: ICD-10-CM

## 2020-08-11 NOTE — PROGRESS NOTES
[facility] OUTPATIENT  Physical Therapy Daily Treatment Note     Name: Benedicto South  Clinic Number: [id number]    Therapy Diagnosis:   Encounter Diagnoses   Name Primary?    Chronic radicular lumbar pain     SI (sacroiliac) joint dysfunction     Impaired gait and mobility      Physician: Demetrio Ayala MD    Visit Date: 8/11/2020    Physician Orders: PT Eval and Treat   Medical Diagnosis from Referral: M54.16 Radiculopathy, lumbar region     Evaluation Date: 6/10/2020  Authorization Period Expiration: 6/8/2021  Plan of Care Expiration: 8/5/2020    Visit # / Visits authorized: 9/ 13  (Freq: Ev + 2w4,1w6)      Total Visit count: 9    PTA Visit: 1/5      Re-assessment due by: 8/16/2020      Time In: 1045  Time Out: 1130      Precautions: Standard      Subjective     Pt reports: She has an 6/10 pain today.       She was not compliant with home exercise program.    Response to previous treatment: no soreness   Functional change: N/A      Pain: 8/10  Location: right back , feet , lower legs and upper legs       Objective     BENEDICTO received therapeutic exercises to develop strength, endurance, ROM, flexibility and core stabilization for 38 minutes including:    · NuStep L3 x 6 min   · Hamstring stretch 60 sec x 2  · Piriformis stretch seated 60 sec x 2   · Sciatic nerve glides B LE 10 x 2   · DKTC with swissball 10 x 2  · LTR with swissball 10 x 2   · PPT with TrA and swissball DKTC 10 x 2 held  · SKTC in sitting 10 x held  · Seated prayer stretch with swissball 3 positions 30 sec each  · + Supine glute sets 1x10 with 10s hold - held  · Attempted supine bridges, but pt c/o discomfort in abdomen from recent procedure and unable to tolerate at this time. held    NP    BENEDICTO received the following direct contact modalities after being cleared for contraindications:N/A    BENEDICTO received the following supervised modalities after being cleared for contradictions: Mechanical  Traction:  Benedicto received intermittent mechanical traction to the lumbar spine at a force of 120 pounds hold for 30 seconds with 70 pound rest for 10 sec. for a total of 15 minutes. Patient tolerated treatment well without any adverse effects.  Patient related a pain reduction to a 1/10 following traction.        BENEDICTO received the following supervised modalities after being cleared for contradictions: IFC Electrical Stimulation:  Benedicto received IFC Electrical Stimulation for pain control applied to the R LB. Pt received stimulation with MHP for  0 minutes. Benedicto tolderated treatment well without any adverse effects.        BENEDICTO received hot pack for as above minutes to as above.    BENEDICTO received cold pack for 0 minutes to N/A.      Home Exercises Provided and Patient Education Provided     Education provided:   - Patient was educated on gradual reintegration of HEP as well as when to stop TE.  Patient verbalzied understanding, performed return demonstration and with no adverse affects noted nor verbalized.      - Importance of performing HEP to improve symptoms and overall functional mobility.     Written Home Exercises Provided: Issued updated HEP.   Exercises were reviewed and BENEDICTO was able to demonstrate them prior to the end of the session.  BENEDICTO demonstrated fair  understanding of the education provided.     See EMR under Media for exercises provided 6/30/2020.    Assessment     Patient participated with PT this day. She was able to aquilino her noted Rx but did require increased recovery periods to rest between exercises.   She did require cueing for improved exs quality and performance and to perform exs without any increase in pain.  She is expected to improve with cont Rx in order to obtain her goals       BENEDICTO is progressing well towards her goals.   Pt prognosis is Fair.     Pt will continue to benefit from skilled outpatient physical therapy to address the deficits listed in the problem list box on initial  evaluation, provide pt/family education and to maximize pt's level of independence in the home and community environment.     Pt's spiritual, cultural and educational needs considered and pt agreeable to plan of care and goals.       Anticipated barriers to physical therapy: Long duration of current problem list, high levels of pain      Goals:     Short Term Goals: 4 weeks   1. Patient will verbalize a reduction of right radicular symptoms to the knee to improve sensation of the right leg during ambulation.   In progress 8/11/2020  2. Patient will report a MARK score of 40% disability to improve her QoL with daily tasks.         In progress 8/11/2020  3. Patient will tolerate standing for 30 minutes without increasing levels of pain to allow her to wash the dishes without having to take a rest break in between each.   4. Patient will tolerate sitting for 2 hours without an increase of pain to allow her to travel moderate distances without difficulty.  In progress 8/11/2020     Long Term Goals: 8 weeks   1. Patient will report a MARK score of 15% disability to improve her QoL with daily tasks.  2. Patient will tolerate standing for 50 minutes without exacerbation of symptoms so she can walk around the grocery store without having to compensate by leaning on the grocery cart.  3. Patient will verbalized a reduction of right radicular symptoms to the mid thigh to improve her sensation of the right leg during ambulation      Plan       Cont POC, progress as aquilino.          Kayla Tomas, PTA

## 2020-08-19 ENCOUNTER — CLINICAL SUPPORT (OUTPATIENT)
Dept: REHABILITATION | Facility: HOSPITAL | Age: 40
End: 2020-08-19
Payer: MEDICAID

## 2020-08-19 DIAGNOSIS — R26.89 IMPAIRED GAIT AND MOBILITY: ICD-10-CM

## 2020-08-19 DIAGNOSIS — G89.29 CHRONIC RADICULAR LUMBAR PAIN: Primary | ICD-10-CM

## 2020-08-19 DIAGNOSIS — M54.16 CHRONIC RADICULAR LUMBAR PAIN: Primary | ICD-10-CM

## 2020-08-19 DIAGNOSIS — M53.3 SI (SACROILIAC) JOINT DYSFUNCTION: ICD-10-CM

## 2020-08-19 PROCEDURE — 97110 THERAPEUTIC EXERCISES: CPT

## 2020-08-19 NOTE — PLAN OF CARE
[facility] OUTPATIENT  Physical Therapy Daily Treatment Note/POC Review     Name: Benedicto South  Clinic Number: [id number]    Therapy Diagnosis:   Encounter Diagnoses   Name Primary?    Chronic radicular lumbar pain Yes    SI (sacroiliac) joint dysfunction     Impaired gait and mobility      Physician: Demetrio Ayala MD    Visit Date: 8/19/2020    Physician Orders: PT Eval and Treat   Medical Diagnosis from Referral: M54.16 Radiculopathy, lumbar region     Evaluation Date: 6/10/2020  Authorization Period Expiration: 10/31/2020  Plan of Care Expiration: 8/5/2020 (Updated POC:from 8/6/2020 through 10/2/2020. 2w6 or 12 visits prn,    Visit # / Visits authorized: 10/ 13  (Freq: Ev + 2w4,1w6) (updated for 8 more visits for a total of 20)      Total Visit count: 10    PTA Visit: 0/5      POC Review due by: 9/19/2020      Time In: 1515  Time Out: 1615      Precautions: Standard      Subjective     Pt reports: She has an 5/10 pain today and she states that she continues to only be able to stand at her counter for about 10 minutes and then must bend and seek support or sit to relieve pain.  Moreover she adds that she can only sit for about 30 minutes before having to shift or move due to pain. Jayme also relates that she is ablele to get all of her home chores completed as opposed to when she first started she could not.  She also stated she had follow up with MD who has requested MRI.       She was not compliant with home exercise program.    Response to previous treatment: no soreness   Functional change: N/A      Pain: 8/10  Location: right back , feet , lower legs and upper legs       Objective     BENEDICTO received therapeutic exercises to develop strength, endurance, ROM, flexibility and core stabilization for 50 minutes including:    · NuStep L3 x 6 min   · Hamstring stretch 60 sec x 2  · Piriformis stretch seated 60 sec x 2   · Sciatic nerve glides B LE 10 x 2   · DKTC with swissball 15 x 2  · LTR  with NO swissball today 15 x 2  · PPT with TrA and swissball DKTC 10 x 2 held  · SKTC in sitting 10 x  · Seated prayer stretch with swissball 3 positions 30 sec each  · + Supine glute sets 1x10 with 10s hold - held  · Attempted supine bridges, but pt c/o discomfort in abdomen from recent procedure and unable to tolerate at this time. held        Objective Measurements:    Functional : MOD I: 38/50 = 76% Disability      NP    BENEDICTO received the following direct contact modalities after being cleared for contraindications:N/A    BENEDICTO received the following supervised modalities after being cleared for contradictions: Mechanical Traction:  Benedicto received intermittent mechanical traction to the lumbar spine at a force of 120 pounds hold for 30 seconds with 70 pound rest for 10 sec. for a total of 15 minutes. Patient tolerated treatment well without any adverse effects.  Patient related a pain reduction to a 1/10 following traction.        BENEDICTO received the following supervised modalities after being cleared for contradictions: IFC Electrical Stimulation:  Benedicto received IFC Electrical Stimulation for pain control applied to the R LB. Pt received stimulation with MHP for  0 minutes. Benedicto tolderated treatment well without any adverse effects.        BENEIDCTO received hot pack for as above minutes to as above.    BENEDICTO received cold pack for 0 minutes to N/A.      Home Exercises Provided and Patient Education Provided     Education provided:   - Patient was educated on gradual reintegration of HEP as well as when to stop TE.  Patient verbalzied understanding, performed return demonstration and with no adverse affects noted nor verbalized.      - Importance of performing HEP to improve symptoms and overall functional mobility.     Written Home Exercises Provided: Issued updated HEP.   Exercises were reviewed and BENEDICTO was able to demonstrate them prior to the end of the session.  BENEDICTO demonstrated fair  understanding of the  education provided.     See EMR under Media for exercises provided 6/30/2020.    Assessment/ POC Review/update     Patient has participated with PT to address her pain, strength activity aquilino and functional mob.  She has made overall progress functionally as she currently is able to complete her day to day activities but still requires recovery time.  This day patient was able to aquilino her noted progressions including LTR without ball.  She reports today a disability score of 76% as per the MOD I and has met  STG 2 while 1,3, and 4 are in progress.  She is expected to steadily improve with cont PT RX in order to improve core and hip strength and activity aquilino and to mitigate pain in order to optimize her function.        BENEDICTO is progressing well towards her goals.   Pt prognosis is Fair.     Pt will continue to benefit from skilled outpatient physical therapy to address the deficits listed in the problem list box on initial evaluation, provide pt/family education and to maximize pt's level of independence in the home and community environment.     Pt's spiritual, cultural and educational needs considered and pt agreeable to plan of care and goals.       Anticipated barriers to physical therapy: Long duration of current problem list, high levels of pain      Goals:     Short Term Goals: 4 weeks   1. Patient will verbalize a reduction of right radicular symptoms to the knee to improve sensation of the right leg during ambulation.   In progress 8/19/2020  2. Patient will report a MARK score of 40% disability to improve her QoL with daily tasks.         Met 8/19/2020  3. Patient will tolerate standing for 30 minutes without increasing levels of pain to allow her to wash the dishes without having to take a rest break in between each. In progress 8/19/2020  4. Patient will tolerate sitting for 2 hours without an increase of pain to allow her to travel moderate distances without difficulty.  In progress 8/19/2020     Long Term  Goals: 8 weeks   1. Patient will report a MARK score of 15% disability to improve her QoL with daily tasks.  2. Patient will tolerate standing for 50 minutes without exacerbation of symptoms so she can walk around the grocery store without having to compensate by leaning on the grocery cart.  3. Patient will verbalized a reduction of right radicular symptoms to the mid thigh to improve her sensation of the right leg during ambulation      Plan       Cont POC, progress as aquilino. cont with lumbar traction        POC valid from 8/6/2020 through 10/2/2020. 2w6 or 12 visits prn, with treatments to consist of: Balance (28821): Improve overall coordination and balance, Cardiovascular, Closed Chain Strengthening, Core Stabilization, Flexibility (82072): improve muscle ROM, flexibility and  function, Home Exercise and Stretching, Patient Education, Plyometrics, Postural Awareness and  Training, Postural Stabilization, ROM Exercises (74815) : Passive or active activities to increase joint ROM, Strengthening  (74376): improve muscle strength and function., Therapeutic Exercise (30633): improve muscle strength, ROM, flexibility and muscle function., Gait Training (42723) : Improve overall gait function including stair climbing, Cross Friction Massage, Manual Stretching (90584): passive or active stretching to improve muscle length and function., Strain/Counter-Strain, Manual Traction, Myofascial Release , Peripheral Joint Mobilization, Soft Tissue Mobs (25155): increase ROM, tissue length, joint mechanics and modulate pain., Spine Mobilization  (73499): increase ROM, tissue length, joint mechanics and modulate pain., Massage (43694):, Combo E-Stim/Ultrasound, Cryotherapy (41272: Application of cold to decrease local swelling and decrease pain., Heat - 63519:  Application of heat to increase local circulation and decrease pain., Hi-Volt E-Stim  (): Application of electrical stimulation to modulate pain., IFC  E-Stim (): Application of electrical stimulation to modulate pain., Mechanical Traction (58353), Micro-Current, Premodulated E-Stim  (): Application of electrical stimulation to modulate pain., TENs E-Stim  (): Application of electrical stimulation to modulate pain., Ultrasound (08382): increase local circulation, improve tissue healing time and modulate pain., Whirlpool (98314): increase local tissue circulation, improve elasticity of tissues, increased blood flow for improved muscle strength, ROM, flexiblity, and function., NMES E-stim ():  Application of electrical stimulation for motor learning and control., Iontophoresis (97184), NMR (48518): re-education of movement, balance, coordination, kinesthetic sense, posture and proprioception, Self Care & Home Management (17529) - Self-care/home management training (e.g., activities of daily living [ADL] and compensatory training, meal preparation, safety procedures, and instructions in use of assistive technology devices/adaptive equipment), direct one-on-one contact (15 minutes), Therapeutic Activity (46722):  Use of dynamic activities to improve functional performance..            I CERTIFY THE NEED FOR THESE SERVICES FURNISHED UNDER THIS PLAN OF TREATMENT AND WHILE UNDER MY CARE     Physician's comments:           Physician's Signature: ___________________________________________________                   Patricio Lipscomb PT

## 2020-08-19 NOTE — PROGRESS NOTES
[facility] OUTPATIENT  Physical Therapy Daily Treatment Note     Name: Benedicto South  Clinic Number: [id number]    Therapy Diagnosis:   Encounter Diagnoses   Name Primary?    Chronic radicular lumbar pain Yes    SI (sacroiliac) joint dysfunction     Impaired gait and mobility      Physician: Demetrio Ayala MD    Visit Date: 8/20/2020    Physician Orders: PT Eval and Treat   Medical Diagnosis from Referral: M54.16 Radiculopathy, lumbar region     Evaluation Date: 6/10/2020  Authorization Period Expiration: 6/8/2021  Plan of Care Expiration: 8/5/2020    Visit # / Visits authorized: 10/ 13  (Freq: Ev + 2w4,1w6)      Total Visit count: 10    PTA Visit: 0/5      POC Review due by: 9/18/2020      Time In: 1345  Time Out: 1425      Precautions: Standard      Subjective     Pt reports: She has has no pain today only some discomfort and has had good affects from traction.      She was not compliant with home exercise program.    Response to previous treatment: no soreness   Functional change: N/A      Pain: 0/10  Location: right back , feet , lower legs and upper legs       Objective     BENEDICTO received therapeutic exercises to develop strength, endurance, ROM, flexibility and core stabilization for 39 minutes including:    · NuStep L3 x 6 min   · Hamstring stretch 60 sec x 2  · Piriformis stretch seated 60 sec x 2   · Sciatic nerve glides B LE 10 x 2   · DKTC with swissball 15 x 2  · LTR with swissball 10 x 2   · SKTC with rope in sitting 10 x  · Seated prayer stretch with swissball 3 positions 30 sec each  · Supine glute sets 1x10 with 10s hold - held  · Attempted supine bridges, but pt c/o discomfort in abdomen from recent procedure and unable to tolerate at this time. held        NP    BENEDICTO received the following direct contact modalities after being cleared for contraindications:N/A    BENEDICTO received the following supervised modalities after being cleared for  contradictions: Mechanical Traction:  Benedicto received intermittent mechanical traction to the lumbar spine at a force of 120 pounds hold for 30 seconds with 70 pound rest for 10 sec. for a total of 0 minutes. Patient tolerated treatment well without any adverse effects.  Patient related a pain reduction to a 1/10 following traction.  Offered and declined- dstated she had to attnend to a family issues      BENEDICTO received the following supervised modalities after being cleared for contradictions: IFC Electrical Stimulation:  Benedicto received IFC Electrical Stimulation for pain control applied to the R LB. Pt received stimulation with MHP for  0 minutes. Benedicto tolderated treatment well without any adverse effects.        BENEDICTO received hot pack for as above minutes to as above.    BENEDICTO received cold pack for 0 minutes to N/A.      Home Exercises Provided and Patient Education Provided     Education provided:   - Patient was educated on gradual reintegration of HEP as well as when to stop TE.  Patient verbalzied understanding, performed return demonstration and with no adverse affects noted nor verbalized.      - Importance of performing HEP to improve symptoms and overall functional mobility.     Written Home Exercises Provided: Issued updated HEP.   Exercises were reviewed and BENEDICTO was able to demonstrate them prior to the end of the session.  BENEDICTO demonstrated fair  understanding of the education provided.     See EMR under Media for exercises provided 6/30/2020.    Assessment/ POC Review     Patient participated with PT this day to cont to address her core and hip strength in order to mitigate her pain.  She required cueing for cont core engagement as well as for exs techniques.  She became distracted toward the end of her Rx and declined mechanical traction this day as she was attending to a family issue via phone.  She is expected to improve and progress with cont PT RX in order to obtain her LTG and optimize her pain  free/reduced mobility.        BENEDICTO is progressing well towards her goals.   Pt prognosis is Fair.     Pt will continue to benefit from skilled outpatient physical therapy to address the deficits listed in the problem list box on initial evaluation, provide pt/family education and to maximize pt's level of independence in the home and community environment.     Pt's spiritual, cultural and educational needs considered and pt agreeable to plan of care and goals.       Anticipated barriers to physical therapy: Long duration of current problem list, high levels of pain      Goals:     Short Term Goals: 4 weeks   1. Patient will verbalize a reduction of right radicular symptoms to the knee to improve sensation of the right leg during ambulation.   In progress 8/20/2020  2. Patient will report a MARK score of 40% disability to improve her QoL with daily tasks.         In progress 8/20/2020  3. Patient will tolerate standing for 30 minutes without increasing levels of pain to allow her to wash the dishes without having to take a rest break in between each.   4. Patient will tolerate sitting for 2 hours without an increase of pain to allow her to travel moderate distances without difficulty.  In progress 8/20/2020     Long Term Goals: 8 weeks   1. Patient will report a MARK score of 15% disability to improve her QoL with daily tasks.  2. Patient will tolerate standing for 50 minutes without exacerbation of symptoms so she can walk around the grocery store without having to compensate by leaning on the grocery cart.  3. Patient will verbalized a reduction of right radicular symptoms to the mid thigh to improve her sensation of the right leg during ambulation      Plan       Cont POC, progress as aquilino.          Patricio Lipscomb, PT

## 2020-08-20 ENCOUNTER — CLINICAL SUPPORT (OUTPATIENT)
Dept: REHABILITATION | Facility: HOSPITAL | Age: 40
End: 2020-08-20
Payer: MEDICAID

## 2020-08-20 DIAGNOSIS — M53.3 SI (SACROILIAC) JOINT DYSFUNCTION: ICD-10-CM

## 2020-08-20 DIAGNOSIS — R26.89 IMPAIRED GAIT AND MOBILITY: ICD-10-CM

## 2020-08-20 DIAGNOSIS — M54.16 CHRONIC RADICULAR LUMBAR PAIN: Primary | ICD-10-CM

## 2020-08-20 DIAGNOSIS — G89.29 CHRONIC RADICULAR LUMBAR PAIN: Primary | ICD-10-CM

## 2020-08-20 PROCEDURE — 97110 THERAPEUTIC EXERCISES: CPT

## 2020-08-27 DIAGNOSIS — M54.16 LUMBAR RADICULOPATHY: Primary | ICD-10-CM

## 2020-08-28 ENCOUNTER — HOSPITAL ENCOUNTER (OUTPATIENT)
Dept: RADIOLOGY | Facility: HOSPITAL | Age: 40
Discharge: HOME | End: 2020-08-28
Attending: PSYCHIATRY & NEUROLOGY
Payer: MEDICAID

## 2020-08-28 DIAGNOSIS — M54.16 LUMBAR RADICULOPATHY: ICD-10-CM

## 2020-08-28 PROCEDURE — 72148 MRI LUMBAR SPINE W/O DYE: CPT | Mod: TC,PO

## 2020-09-29 ENCOUNTER — DOCUMENTATION ONLY (OUTPATIENT)
Dept: REHABILITATION | Facility: HOSPITAL | Age: 40
End: 2020-09-29

## 2020-09-29 PROBLEM — M54.16 CHRONIC RADICULAR LUMBAR PAIN: Status: RESOLVED | Noted: 2020-06-10 | Resolved: 2020-09-29

## 2020-09-29 PROBLEM — G89.29 CHRONIC RADICULAR LUMBAR PAIN: Status: RESOLVED | Noted: 2020-06-10 | Resolved: 2020-09-29

## 2020-09-29 PROBLEM — R26.89 IMPAIRED GAIT AND MOBILITY: Status: RESOLVED | Noted: 2020-06-10 | Resolved: 2020-09-29

## 2020-09-29 PROBLEM — M53.3 SI (SACROILIAC) JOINT DYSFUNCTION: Status: RESOLVED | Noted: 2020-06-10 | Resolved: 2020-09-29

## 2020-09-29 NOTE — PROGRESS NOTES
Outpatient Therapy Discharge Summary     Name: Monica South  Clinic Number: [id number]      Therapy Diagnosis:        Encounter Diagnoses   Name Primary?    Chronic radicular lumbar pain Yes    SI (sacroiliac) joint dysfunction      Impaired gait and mobility        Physician: Demetrio Ayala MD    Physician Orders: PT Eval and Treat   Medical Diagnosis from Referral: M54.16 Radiculopathy, lumbar region      Evaluation Date: 6/10/2020    Date of Last visit: 8/20/2020    Total Visits Received: 10      Assessment      Patient is a 41 y/o female with an MD Dx of Radiculopathy, lumbar region.  She was originally evaluated on 6/10/2020 and was seen for 10 follow up visits with her most recent occurring on 8/20/2020.  At that time goals 1, 2, and 4 were in progress and all other goals were not addressed.  She had good success with mechanical traction, and since 8/20/2020 has not returned to PT.  She is currently discharged.      Discharge reason: Patient has not attended therapy since 8/20/2020    Plan   This patient is discharged from Physical Therapy and is to cont with their HEP and MD follow up PRN.        Patricio Lipscomb, PT

## 2020-11-24 ENCOUNTER — HOSPITAL ENCOUNTER (EMERGENCY)
Facility: HOSPITAL | Age: 40
Discharge: HOME | End: 2020-11-25
Attending: EMERGENCY MEDICINE
Payer: MEDICAID

## 2020-11-24 DIAGNOSIS — F14.10 COCAINE ABUSE: Primary | ICD-10-CM

## 2020-11-24 DIAGNOSIS — R13.19 ESOPHAGEAL DYSPHAGIA: ICD-10-CM

## 2020-11-24 DIAGNOSIS — F14.94 COCAINE-INDUCED MOOD DISORDER: ICD-10-CM

## 2020-11-24 DIAGNOSIS — R07.9 CHEST PAIN: ICD-10-CM

## 2020-11-24 DIAGNOSIS — E87.6 HYPOKALEMIA: ICD-10-CM

## 2020-11-24 LAB
ALBUMIN SERPL BCP-MCNC: 3.9 G/DL (ref 3.5–5.2)
ALP SERPL-CCNC: 72 U/L (ref 55–135)
ALT SERPL W/O P-5'-P-CCNC: 33 U/L (ref 10–44)
ANION GAP SERPL CALC-SCNC: 16 MMOL/L (ref 8–16)
APAP SERPL-MCNC: <3 UG/ML (ref 10–20)
AST SERPL-CCNC: 22 U/L (ref 10–40)
BASOPHILS # BLD AUTO: 0.05 K/UL (ref 0–0.2)
BASOPHILS NFR BLD: 0.4 % (ref 0–1.9)
BILIRUB SERPL-MCNC: 0.4 MG/DL (ref 0.1–1)
BUN SERPL-MCNC: 3 MG/DL (ref 6–20)
CALCIUM SERPL-MCNC: 8.9 MG/DL (ref 8.7–10.5)
CHLORIDE SERPL-SCNC: 100 MMOL/L (ref 95–110)
CO2 SERPL-SCNC: 23 MMOL/L (ref 23–29)
CREAT SERPL-MCNC: 0.7 MG/DL (ref 0.5–1.4)
DIFFERENTIAL METHOD: ABNORMAL
EOSINOPHIL # BLD AUTO: 0.1 K/UL (ref 0–0.5)
EOSINOPHIL NFR BLD: 0.6 % (ref 0–8)
ERYTHROCYTE [DISTWIDTH] IN BLOOD BY AUTOMATED COUNT: 14.1 % (ref 11.5–14.5)
EST. GFR  (AFRICAN AMERICAN): >60 ML/MIN/1.73 M^2
EST. GFR  (NON AFRICAN AMERICAN): >60 ML/MIN/1.73 M^2
ETHANOL SERPL-MCNC: <10 MG/DL
GLUCOSE SERPL-MCNC: 97 MG/DL (ref 70–110)
HCT VFR BLD AUTO: 38.9 % (ref 37–48.5)
HGB BLD-MCNC: 12.4 G/DL (ref 12–16)
IMM GRANULOCYTES # BLD AUTO: 0.04 K/UL (ref 0–0.04)
IMM GRANULOCYTES NFR BLD AUTO: 0.3 % (ref 0–0.5)
LYMPHOCYTES # BLD AUTO: 2.4 K/UL (ref 1–4.8)
LYMPHOCYTES NFR BLD: 18.3 % (ref 18–48)
MCH RBC QN AUTO: 27.1 PG (ref 27–31)
MCHC RBC AUTO-ENTMCNC: 31.9 G/DL (ref 32–36)
MCV RBC AUTO: 85 FL (ref 82–98)
MONOCYTES # BLD AUTO: 0.9 K/UL (ref 0.3–1)
MONOCYTES NFR BLD: 6.5 % (ref 4–15)
NEUTROPHILS # BLD AUTO: 9.7 K/UL (ref 1.8–7.7)
NEUTROPHILS NFR BLD: 73.9 % (ref 38–73)
NRBC BLD-RTO: 0 /100 WBC
PLATELET # BLD AUTO: 386 K/UL (ref 150–350)
PMV BLD AUTO: 8.7 FL (ref 9.2–12.9)
POTASSIUM SERPL-SCNC: 3.1 MMOL/L (ref 3.5–5.1)
PROT SERPL-MCNC: 7.5 G/DL (ref 6–8.4)
RBC # BLD AUTO: 4.58 M/UL (ref 4–5.4)
SARS-COV-2 RDRP RESP QL NAA+PROBE: NEGATIVE
SODIUM SERPL-SCNC: 139 MMOL/L (ref 136–145)
TROPONIN I SERPL DL<=0.01 NG/ML-MCNC: <0.006 NG/ML (ref 0–0.03)
TSH SERPL DL<=0.005 MIU/L-ACNC: 0.89 UIU/ML (ref 0.4–4)
WBC # BLD AUTO: 13.14 K/UL (ref 3.9–12.7)

## 2020-11-24 PROCEDURE — 80329 ANALGESICS NON-OPIOID 1 OR 2: CPT

## 2020-11-24 PROCEDURE — 80320 DRUG SCREEN QUANTALCOHOLS: CPT

## 2020-11-24 PROCEDURE — 99203 OFFICE O/P NEW LOW 30 MIN: CPT | Mod: 95,AF,HB, | Performed by: PSYCHIATRY & NEUROLOGY

## 2020-11-24 PROCEDURE — 36415 COLL VENOUS BLD VENIPUNCTURE: CPT

## 2020-11-24 PROCEDURE — 25000242 PHARM REV CODE 250 ALT 637 W/ HCPCS: Performed by: EMERGENCY MEDICINE

## 2020-11-24 PROCEDURE — 93010 EKG 12-LEAD: ICD-10-PCS | Mod: ,,, | Performed by: SPECIALIST

## 2020-11-24 PROCEDURE — 63600175 PHARM REV CODE 636 W HCPCS: Performed by: EMERGENCY MEDICINE

## 2020-11-24 PROCEDURE — 84484 ASSAY OF TROPONIN QUANT: CPT

## 2020-11-24 PROCEDURE — 25000003 PHARM REV CODE 250: Performed by: EMERGENCY MEDICINE

## 2020-11-24 PROCEDURE — 99203 PR OFFICE/OUTPT VISIT, NEW, LEVL III, 30-44 MIN: ICD-10-PCS | Mod: 95,AF,HB, | Performed by: PSYCHIATRY & NEUROLOGY

## 2020-11-24 PROCEDURE — U0002 COVID-19 LAB TEST NON-CDC: HCPCS

## 2020-11-24 PROCEDURE — 96374 THER/PROPH/DIAG INJ IV PUSH: CPT

## 2020-11-24 PROCEDURE — 84443 ASSAY THYROID STIM HORMONE: CPT

## 2020-11-24 PROCEDURE — 96375 TX/PRO/DX INJ NEW DRUG ADDON: CPT

## 2020-11-24 PROCEDURE — 85025 COMPLETE CBC W/AUTO DIFF WBC: CPT

## 2020-11-24 PROCEDURE — 99284 EMERGENCY DEPT VISIT MOD MDM: CPT | Mod: 25

## 2020-11-24 PROCEDURE — 94640 AIRWAY INHALATION TREATMENT: CPT

## 2020-11-24 PROCEDURE — 93010 ELECTROCARDIOGRAM REPORT: CPT | Mod: ,,, | Performed by: SPECIALIST

## 2020-11-24 PROCEDURE — 93005 ELECTROCARDIOGRAM TRACING: CPT

## 2020-11-24 PROCEDURE — 80053 COMPREHEN METABOLIC PANEL: CPT

## 2020-11-24 RX ORDER — TIZANIDINE 4 MG/1
4 TABLET ORAL EVERY 6 HOURS PRN
COMMUNITY

## 2020-11-24 RX ORDER — PANTOPRAZOLE SODIUM 20 MG/1
20 TABLET, DELAYED RELEASE ORAL DAILY
Qty: 30 TABLET | Refills: 0 | Status: SHIPPED | OUTPATIENT
Start: 2020-11-24 | End: 2021-11-24

## 2020-11-24 RX ORDER — GABAPENTIN 800 MG/1
800 TABLET ORAL 3 TIMES DAILY
COMMUNITY

## 2020-11-24 RX ORDER — ONDANSETRON 2 MG/ML
4 INJECTION INTRAMUSCULAR; INTRAVENOUS
Status: COMPLETED | OUTPATIENT
Start: 2020-11-24 | End: 2020-11-24

## 2020-11-24 RX ORDER — HYDROCODONE BITARTRATE AND ACETAMINOPHEN 7.5; 325 MG/1; MG/1
1 TABLET ORAL EVERY 6 HOURS PRN
COMMUNITY

## 2020-11-24 RX ORDER — QUETIAPINE FUMARATE 50 MG/1
50 TABLET, FILM COATED ORAL NIGHTLY
Qty: 10 TABLET | Refills: 0 | Status: SHIPPED | OUTPATIENT
Start: 2020-11-24 | End: 2022-09-02

## 2020-11-24 RX ORDER — ALBUTEROL SULFATE 1.25 MG/3ML
1.25 SOLUTION RESPIRATORY (INHALATION) EVERY 6 HOURS PRN
COMMUNITY

## 2020-11-24 RX ORDER — LORAZEPAM 2 MG/ML
1 INJECTION INTRAMUSCULAR
Status: COMPLETED | OUTPATIENT
Start: 2020-11-24 | End: 2020-11-24

## 2020-11-24 RX ORDER — ONDANSETRON 4 MG/1
4 TABLET, ORALLY DISINTEGRATING ORAL EVERY 8 HOURS PRN
Qty: 15 TABLET | Refills: 0 | Status: SHIPPED | OUTPATIENT
Start: 2020-11-24

## 2020-11-24 RX ORDER — ALBUTEROL SULFATE 2.5 MG/.5ML
2.5 SOLUTION RESPIRATORY (INHALATION)
Status: COMPLETED | OUTPATIENT
Start: 2020-11-24 | End: 2020-11-24

## 2020-11-24 RX ORDER — POTASSIUM CHLORIDE 20 MEQ/1
40 TABLET, EXTENDED RELEASE ORAL
Status: COMPLETED | OUTPATIENT
Start: 2020-11-24 | End: 2020-11-24

## 2020-11-24 RX ADMIN — ONDANSETRON 4 MG: 2 INJECTION INTRAMUSCULAR; INTRAVENOUS at 10:11

## 2020-11-24 RX ADMIN — ALBUTEROL SULFATE 2.5 MG: 2.5 SOLUTION RESPIRATORY (INHALATION) at 09:11

## 2020-11-24 RX ADMIN — LORAZEPAM 1 MG: 2 INJECTION INTRAMUSCULAR; INTRAVENOUS at 10:11

## 2020-11-24 RX ADMIN — POTASSIUM CHLORIDE 40 MEQ: 1500 TABLET, EXTENDED RELEASE ORAL at 11:11

## 2020-11-25 VITALS
SYSTOLIC BLOOD PRESSURE: 131 MMHG | TEMPERATURE: 98 F | RESPIRATION RATE: 18 BRPM | DIASTOLIC BLOOD PRESSURE: 80 MMHG | HEART RATE: 82 BPM | WEIGHT: 283 LBS | OXYGEN SATURATION: 97 % | HEIGHT: 64 IN | BODY MASS INDEX: 48.32 KG/M2

## 2020-11-25 NOTE — ED NOTES
Pt appears to be sleeping soundly at this time. Respirations even and regular and in no noted distress at this time.

## 2020-11-25 NOTE — ED NOTES
Patient has been updated on plan of care and lab results. No needs or questions at this time.   Patient opened her eyes with the sound of my voice.

## 2020-11-25 NOTE — ED NOTES
Report received from Chiqui Florian RN. Care of patient assumed at this time. Patient is resting in bed with her eyes closed, chest rise is symmetrical.

## 2020-11-25 NOTE — ED NOTES
Patient opens her eyes with the sound of my voice. Patient is requesting food. 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 AAOx4.

## 2020-11-25 NOTE — CONSULTS
"[facility]  Psychiatry  Telepsychiatry Consult Note    Please see previous notes:  Patient agreeable to consultation via telepsychiatry.  Tele-Consultation from Psychiatry started: 11/24/2020 at 2230  The chief complaint leading to psychiatric consultation is: ah/vh  This consultation was requested by dr denis, the Emergency Department attending physician.  The location of the consulting psychiatrist is [address] [place] [address].  The patient location is  [facility] EMERGENCY DEPARTMENT   The patient arrived at the ED at: 2130    Also present with the patient at the time of the consultation: ed staff  Patient Identification:   Monica South is a 40 y.o. female.  Patient information was obtained from patient and past medical records.  Patient presented voluntarily to the Emergency Department by private vehicle.    Consults  Subjective:     History of Present Illness: This is a 41 y/o female that presented to the ED 2/2 "Monica South is a 40 y.o. female with a Hx of anxiety, depression, and asthma who presents to the ED via EMS with an onset of chest discomfort as well as visual and auditory hallucinations. She states she feels burning in her chest and throat. She endorses drug use the last three days. She states she has not been able to eat the last three days and has only drank two bottles of water. She states she has had trouble sleeping the last few nights. She denies thoughts of suicide or hurting others. She reports having her esophagus stretched a few weeks ago. She says she regularly takes Nexium. No other PSHx noted. No Hx of schizophrenia or hearing voices. Previous SHx use of cocaine." On exam the pt reports,  that she has been "seeing spirits and hearing voices". States this started a few days ago. Hx of cocaine use d/o, with recent use. Possibly substance induced or secondary gain. No past primary psychiatric disorder. Exam limited 2/2 pt's difficulty staying awake. Stable for outpt follow " "up.        Psychiatric Mental Status Exam:  Arousal: lethargic  Sensorium/Orientation: oriented to grossly intact  Behavior/Cooperation: reluctant to participate, uncooperative   Speech: slowed  Language: grossly intact  Mood: " ok "   Affect: blunted  Thought Process: normal and logical  Thought Content:   Auditory hallucinations: NO  Visual hallucinations: NO  Paranoia: NO  Delusions:  NO  Suicidal ideation: NO  Homicidal ideation: NO  Insight: poor awareness of illness  Judgment: behavior is adequate to circumstances, limited      Past Medical History:   Past Medical History:   Diagnosis Date    Anxiety     Asthma     Chronic back pain     Depression       Laboratory Data:   Labs Reviewed   CBC W/ AUTO DIFFERENTIAL - Abnormal; Notable for the following components:       Result Value    WBC 13.14 (*)     MCHC 31.9 (*)     Platelets 386 (*)     MPV 8.7 (*)     Gran # (ANC) 9.7 (*)     Gran % 73.9 (*)     All other components within normal limits   COMPREHENSIVE METABOLIC PANEL   TROPONIN I   URINALYSIS, REFLEX TO URINE CULTURE   DRUG SCREEN PANEL, URINE EMERGENCY   ALCOHOL,MEDICAL (ETHANOL)   ACETAMINOPHEN LEVEL   SARS-COV-2 RNA AMPLIFICATION, QUAL   TSH     Review of patient's allergies indicates:  No Known Allergies  Medications in ER:   Medications   albuterol sulfate nebulizer solution 2.5 mg (2.5 mg Nebulization Given 11/24/20 2134)   ondansetron injection 4 mg (4 mg Intravenous Given 11/24/20 2211)   lorazepam injection 1 mg (1 mg Intravenous Given 11/24/20 2212)   Medications at home: none psych  No new subjective & objective note has been filed under this hospital service since the last note was generated.      Assessment - Diagnosis - Goals:     Diagnosis/Impression:   - Malingering  - r/o SIPD      Rec:   - Safe to d/c home  - Consider script for Seroquel 50 mg prn     Time with patient: 30 min  More than 50% of the time was spent counseling/coordinating care  Consulting clinician was informed of the " encounter and consult note.  Consultation ended: 11/24/2020 at 2300    Jose Juan Hogan MD, MRO   Psychiatry  [facility] min

## 2020-11-25 NOTE — ED PROVIDER NOTES
Encounter Date: 2020    SCRIBE #1 NOTE: I, Jenna Hill, am scribing for, and in the presence of, Roni Brush MD.       History     Chief Complaint   Patient presents with    Altered Mental Status     auditory and visual hallucinations started today, history of depression and anxiety    Nausea     for a couple of days, unable to eat or drink       Time seen by provider: 8:48 PM on 2020    Monica South is a 40 y.o. female with a Hx of anxiety, depression, and asthma who presents to the ED via EMS with an onset of chest discomfort as well as visual and auditory hallucinations. She states she feels burning in her chest and throat. She endorses drug use the last three days. She states she has not been able to eat the last three days and has only drank two bottles of water. She states she has had trouble sleeping the last few nights. She denies thoughts of suicide or hurting others. She reports having her esophagus stretched a few weeks ago. She says she regularly takes Nexium. No other PSHx noted. No Hx of schizophrenia or hearing voices. Previous SHx use of cocaine.         The history is provided by the patient.     Review of patient's allergies indicates:  No Known Allergies  Past Medical History:   Diagnosis Date    Anxiety     Asthma     Chronic back pain     Depression      Past Surgical History:   Procedure Laterality Date     SECTION       History reviewed. No pertinent family history.  Social History     Tobacco Use    Smoking status: Current Every Day Smoker     Packs/day: 1.00     Types: Cigarettes   Substance Use Topics    Alcohol use: Not Currently    Drug use: Yes     Types: Cocaine     Comment: a few days ago     Review of Systems   Constitutional: Positive for appetite change. Negative for fever.   HENT: Negative for sore throat.         Positive for pyrosis   Respiratory: Negative for shortness of breath.    Gastrointestinal: Negative for nausea.   Genitourinary:  Negative for dysuria.   Musculoskeletal: Negative for back pain.   Skin: Negative for rash.   Neurological: Negative for weakness.   Hematological: Does not bruise/bleed easily.   Psychiatric/Behavioral: Positive for hallucinations and sleep disturbance. Negative for suicidal ideas.       Physical Exam     Initial Vitals [11/24/20 2038]   BP Pulse Resp Temp SpO2   (!) 154/64 104 20 98.3 °F (36.8 °C) 99 %      MAP       --         Physical Exam    Nursing note and vitals reviewed.  Constitutional: She appears well-developed.  Non-toxic appearance.   Nontoxic, well appearing female.    HENT:   Head: Normocephalic and atraumatic.   Eyes: EOM are normal. Pupils are equal, round, and reactive to light.   Neck: Neck supple.   Cardiovascular: Normal rate, regular rhythm, normal heart sounds and intact distal pulses. Exam reveals no gallop and no friction rub.    No murmur heard.  Pulmonary/Chest: No respiratory distress. She has no decreased breath sounds. She has wheezes. She has no rhonchi. She has no rales.   Subtle expiratory wheezing.   Abdominal: Soft. Bowel sounds are normal. She exhibits no distension. There is no abdominal tenderness.   Musculoskeletal: Normal range of motion.   Neurological: She is alert and oriented to person, place, and time.   Skin: Skin is warm and dry.   No track marks.   Psychiatric: Her mood appears anxious. Her speech is not rapid and/or pressured. Thought content is not paranoid.   Anxious but pleasant. Not actively homicidal or suicidal. No tangential, circumferential, or rapid speech.         ED Course   Procedures  Labs Reviewed   CBC W/ AUTO DIFFERENTIAL   COMPREHENSIVE METABOLIC PANEL   TROPONIN I   URINALYSIS, REFLEX TO URINE CULTURE   DRUG SCREEN PANEL, URINE EMERGENCY   ALCOHOL,MEDICAL (ETHANOL)   ACETAMINOPHEN LEVEL   SARS-COV-2 RNA AMPLIFICATION, QUAL   TSH          Imaging Results    None          Medical Decision Making:   History:   Old Medical Records: I decided to obtain  old medical records.  Clinical Tests:   Lab Tests: Ordered and Reviewed            Scribe Attestation:   Scribe #1: I performed the above scribed service and the documentation accurately describes the services I performed. I attest to the accuracy of the note.        I, Dr. Roni Brush personally performed the services described in this documentation. All medical record entries made by the scribe were at my direction and in my presence.  I have reviewed the chart and agree that the record reflects my personal performance and is accurate and complete. Roni Brush MD.  4:03 AM 11/28/2020    DISCLAIMER: This note was prepared with Dragon NaturallySpeaking voice recognition transcription software. Garbled syntax, mangled pronouns, and other bizarre constructions may be attributed to that software system      ED Course as of Nov 28 0402   Tue Nov 24, 2020 2111 Patient likely has hallucinations 2/2 cocaine binge over the weekend.  She just had a separation w/ boyfriend and was intrigued regarding psychiatric hospitalizaition but doesn't have SI/ HI and doesn't appear to have significant hallucinatioins at this time.  Will get telepsych consult.     [JS]   2252 Awaiting drug screen.  Patient does have mild hypokalemia.  I will replace orally.    [JS]   2322 Psychiatry has seen the patient believe she is stable for outpatient follow-up.  I agree with this plan.  Will discharge and urine drug screen is not return but she admits to doing cocaine.  This is likely cocaine induced hallucinations.    [JS]   2323 Psychiatrist recommends Seroquel 50 mg q.h.s. p.r.n.    [JS]   2330 Rate 85 normal sinus rate rhythm axis and intervals with no ST segment elevation or depression, isolated T-wave inversion lead aVL.    [JS]      ED Course User Index  [JS] Roni Brush MD            Clinical Impression:     ICD-10-CM ICD-9-CM   1. Chest pain  R07.9 786.50                                               Roni Brush,  MD  11/28/20 0403

## 2020-11-25 NOTE — ED NOTES
"Patient assisted to restroom. Patient is unsteady on her feet. She states that she is sleepy from meds. Patient reports that she "Does not have a ride home. I am homeless. My boyfriend kicked me out. I am supposed to go to a psych facility."   "

## 2020-11-28 ENCOUNTER — HOSPITAL ENCOUNTER (EMERGENCY)
Facility: HOSPITAL | Age: 40
Discharge: HOME | End: 2020-11-28
Attending: EMERGENCY MEDICINE
Payer: MEDICAID

## 2020-11-28 ENCOUNTER — HOSPITAL ENCOUNTER (EMERGENCY)
Facility: HOSPITAL | Age: 40
Discharge: PSYCHIATRIC OR SUBSTANCE USE FACILITY | End: 2020-11-29
Attending: EMERGENCY MEDICINE
Payer: MEDICAID

## 2020-11-28 VITALS
RESPIRATION RATE: 15 BRPM | BODY MASS INDEX: 48.32 KG/M2 | DIASTOLIC BLOOD PRESSURE: 89 MMHG | HEART RATE: 81 BPM | HEIGHT: 64 IN | SYSTOLIC BLOOD PRESSURE: 131 MMHG | TEMPERATURE: 99 F | WEIGHT: 283 LBS | OXYGEN SATURATION: 98 %

## 2020-11-28 DIAGNOSIS — R45.851 SUICIDAL IDEATION: Primary | ICD-10-CM

## 2020-11-28 DIAGNOSIS — F22 PARANOIA: Primary | ICD-10-CM

## 2020-11-28 LAB
ALBUMIN SERPL BCP-MCNC: 3.8 G/DL (ref 3.5–5.2)
ALP SERPL-CCNC: 59 U/L (ref 55–135)
ALT SERPL W/O P-5'-P-CCNC: 27 U/L (ref 10–44)
AMPHET+METHAMPHET UR QL: NORMAL
ANION GAP SERPL CALC-SCNC: 9 MMOL/L (ref 8–16)
APAP SERPL-MCNC: <10 UG/ML (ref 10–20)
APTT PPP: 27.5 SEC (ref 23.6–33.3)
AST SERPL-CCNC: 21 U/L (ref 10–40)
B-HCG UR QL: NEGATIVE
BARBITURATES UR QL SCN>200 NG/ML: NEGATIVE
BASOPHILS # BLD AUTO: 0.04 K/UL (ref 0–0.2)
BASOPHILS NFR BLD: 0.3 % (ref 0–1.9)
BENZODIAZ UR QL SCN>200 NG/ML: NORMAL
BILIRUB SERPL-MCNC: 0.4 MG/DL (ref 0.1–1)
BILIRUB UR QL STRIP: NEGATIVE
BNP SERPL-MCNC: 14 PG/ML (ref 0–99)
BUN SERPL-MCNC: 5 MG/DL (ref 6–20)
BZE UR QL SCN: NORMAL
CALCIUM SERPL-MCNC: 8.7 MG/DL (ref 8.7–10.5)
CANNABINOIDS UR QL SCN: NEGATIVE
CHLORIDE SERPL-SCNC: 102 MMOL/L (ref 95–110)
CLARITY UR: CLEAR
CO2 SERPL-SCNC: 25 MMOL/L (ref 23–29)
COLOR UR: COLORLESS
CREAT SERPL-MCNC: 0.8 MG/DL (ref 0.5–1.4)
CREAT UR-MCNC: 25 MG/DL (ref 15–325)
CTP QC/QA: YES
DIFFERENTIAL METHOD: ABNORMAL
EOSINOPHIL # BLD AUTO: 0.1 K/UL (ref 0–0.5)
EOSINOPHIL NFR BLD: 0.9 % (ref 0–8)
ERYTHROCYTE [DISTWIDTH] IN BLOOD BY AUTOMATED COUNT: 14.3 % (ref 11.5–14.5)
EST. GFR  (AFRICAN AMERICAN): >60 ML/MIN/1.73 M^2
EST. GFR  (NON AFRICAN AMERICAN): >60 ML/MIN/1.73 M^2
ETHANOL SERPL-MCNC: <5 MG/DL
GLUCOSE SERPL-MCNC: 96 MG/DL (ref 70–110)
GLUCOSE UR QL STRIP: NEGATIVE
HCT VFR BLD AUTO: 39.6 % (ref 37–48.5)
HGB BLD-MCNC: 12.7 G/DL (ref 12–16)
HGB UR QL STRIP: NEGATIVE
IMM GRANULOCYTES # BLD AUTO: 0.06 K/UL (ref 0–0.04)
IMM GRANULOCYTES NFR BLD AUTO: 0.5 % (ref 0–0.5)
INR PPP: 1.1
KETONES UR QL STRIP: NEGATIVE
LEUKOCYTE ESTERASE UR QL STRIP: NEGATIVE
LIPASE SERPL-CCNC: 29 U/L (ref 4–60)
LYMPHOCYTES # BLD AUTO: 2.3 K/UL (ref 1–4.8)
LYMPHOCYTES NFR BLD: 18.3 % (ref 18–48)
MAGNESIUM SERPL-MCNC: 2.1 MG/DL (ref 1.6–2.6)
MCH RBC QN AUTO: 27.2 PG (ref 27–31)
MCHC RBC AUTO-ENTMCNC: 32.1 G/DL (ref 32–36)
MCV RBC AUTO: 85 FL (ref 82–98)
MONOCYTES # BLD AUTO: 0.8 K/UL (ref 0.3–1)
MONOCYTES NFR BLD: 6.3 % (ref 4–15)
NEUTROPHILS # BLD AUTO: 9.5 K/UL (ref 1.8–7.7)
NEUTROPHILS NFR BLD: 73.7 % (ref 38–73)
NITRITE UR QL STRIP: NEGATIVE
NRBC BLD-RTO: 0 /100 WBC
OPIATES UR QL SCN: NEGATIVE
PCP UR QL SCN>25 NG/ML: NEGATIVE
PH UR STRIP: 7 [PH] (ref 5–8)
PLATELET # BLD AUTO: 403 K/UL (ref 150–350)
PMV BLD AUTO: 9.4 FL (ref 9.2–12.9)
POTASSIUM SERPL-SCNC: 3.6 MMOL/L (ref 3.5–5.1)
PROT SERPL-MCNC: 7.2 G/DL (ref 6–8.4)
PROT UR QL STRIP: NEGATIVE
PROTHROMBIN TIME: 13.8 SEC (ref 10.6–14.8)
RBC # BLD AUTO: 4.67 M/UL (ref 4–5.4)
SALICYLATES SERPL-MCNC: <4 MG/DL (ref 15–30)
SARS-COV-2 RDRP RESP QL NAA+PROBE: NEGATIVE
SODIUM SERPL-SCNC: 136 MMOL/L (ref 136–145)
SP GR UR STRIP: 1 (ref 1–1.03)
TOXICOLOGY INFORMATION: NORMAL
TROPONIN I SERPL DL<=0.01 NG/ML-MCNC: <0.03 NG/ML
TSH SERPL DL<=0.005 MIU/L-ACNC: 1 UIU/ML (ref 0.34–5.6)
URN SPEC COLLECT METH UR: ABNORMAL
UROBILINOGEN UR STRIP-ACNC: NEGATIVE EU/DL
WBC # BLD AUTO: 12.82 K/UL (ref 3.9–12.7)

## 2020-11-28 PROCEDURE — 85610 PROTHROMBIN TIME: CPT

## 2020-11-28 PROCEDURE — 93010 ELECTROCARDIOGRAM REPORT: CPT | Mod: ,,, | Performed by: INTERNAL MEDICINE

## 2020-11-28 PROCEDURE — 83735 ASSAY OF MAGNESIUM: CPT

## 2020-11-28 PROCEDURE — 85730 THROMBOPLASTIN TIME PARTIAL: CPT

## 2020-11-28 PROCEDURE — 81025 URINE PREGNANCY TEST: CPT | Performed by: NURSE PRACTITIONER

## 2020-11-28 PROCEDURE — 84443 ASSAY THYROID STIM HORMONE: CPT

## 2020-11-28 PROCEDURE — 83690 ASSAY OF LIPASE: CPT

## 2020-11-28 PROCEDURE — 84484 ASSAY OF TROPONIN QUANT: CPT

## 2020-11-28 PROCEDURE — 83880 ASSAY OF NATRIURETIC PEPTIDE: CPT

## 2020-11-28 PROCEDURE — 93005 ELECTROCARDIOGRAM TRACING: CPT | Performed by: INTERNAL MEDICINE

## 2020-11-28 PROCEDURE — U0002 COVID-19 LAB TEST NON-CDC: HCPCS

## 2020-11-28 PROCEDURE — 36415 COLL VENOUS BLD VENIPUNCTURE: CPT

## 2020-11-28 PROCEDURE — 99285 EMERGENCY DEPT VISIT HI MDM: CPT | Mod: 25

## 2020-11-28 PROCEDURE — 80307 DRUG TEST PRSMV CHEM ANLYZR: CPT | Mod: 59

## 2020-11-28 PROCEDURE — 80053 COMPREHEN METABOLIC PANEL: CPT

## 2020-11-28 PROCEDURE — 93010 EKG 12-LEAD: ICD-10-PCS | Mod: ,,, | Performed by: INTERNAL MEDICINE

## 2020-11-28 PROCEDURE — 81003 URINALYSIS AUTO W/O SCOPE: CPT | Mod: 59

## 2020-11-28 PROCEDURE — 85025 COMPLETE CBC W/AUTO DIFF WBC: CPT

## 2020-11-28 PROCEDURE — 80320 DRUG SCREEN QUANTALCOHOLS: CPT

## 2020-11-28 PROCEDURE — 80307 DRUG TEST PRSMV CHEM ANLYZR: CPT
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
## 2020-11-29 VITALS
DIASTOLIC BLOOD PRESSURE: 62 MMHG | SYSTOLIC BLOOD PRESSURE: 130 MMHG | OXYGEN SATURATION: 98 % | RESPIRATION RATE: 18 BRPM | TEMPERATURE: 98 F | HEART RATE: 89 BPM

## 2020-11-29 NOTE — DISCHARGE INSTRUCTIONS
Please read and follow discharge instructions and return precautions.  Do not use cocaine or other drugs as this will worsen your symptoms.  Important to see your physician in 2 days.  Return immediately if you develop new or worsening symptoms, if you develop any suicidal thoughts, or if you have any new problems or concerns.

## 2020-11-29 NOTE — ED NOTES
Resumed care from KELLY Chen. Rounding on the patient has been done. she has been updated on the plan of care and her current status.  Comfort positioning and restroom needs were addressed. she was advised when a reassessment would take place. The patient is resting comfortably on the stretcher, respirations are even and unlabored, skin warm and dry. [employer] sitter outside room maintaining visual contact with patient.

## 2020-11-29 NOTE — ED NOTES
Pt. Back in ER after being discharged by MD order. Pt states she decided it was best she get a psychiatric evaluation from a facility so she came back. PT. ADDISON, explained process. No further complaints at this time.

## 2020-11-29 NOTE — ED PROVIDER NOTES
"Encounter Date: 2020       History     Chief Complaint   Patient presents with    Psychiatric Evaluation     reports "paranoia ideations" and denies SI but states "I need help".      This is a 40-year-old female who presents complaining of suicidal thoughts.  The patient was previously evaluated in the emergency room.  She was complaining of paranoid thoughts associated with cocaine use.  She at that time denies any suicidal thoughts.  She re-presented to the emergency room shortly after discharge stating that she is now having suicidal thoughts.  She denies any acute medical issues problems or complaints otherwise.        Review of patient's allergies indicates:  No Known Allergies  Past Medical History:   Diagnosis Date    Anxiety     Asthma     Chronic back pain     Depression     Drug use     Obese      Past Surgical History:   Procedure Laterality Date     SECTION       No family history on file.  Social History     Tobacco Use    Smoking status: Current Every Day Smoker     Packs/day: 1.00     Types: Cigarettes   Substance Use Topics    Alcohol use: Not Currently    Drug use: Yes     Types: Cocaine     Comment: a few days ago     Review of Systems   Constitutional: Negative.  Negative for activity change, appetite change, chills, fatigue and fever.   HENT: Negative.  Negative for congestion, dental problem, ear pain, rhinorrhea, sinus pressure, sinus pain, sore throat and trouble swallowing.    Eyes: Negative.  Negative for photophobia, pain, redness and visual disturbance.   Respiratory: Negative.  Negative for cough, chest tightness, shortness of breath and wheezing.    Cardiovascular: Negative.  Negative for chest pain, palpitations and leg swelling.   Gastrointestinal: Negative.  Negative for abdominal distention, abdominal pain, anal bleeding, blood in stool, constipation, diarrhea, nausea and vomiting.   Endocrine: Negative.    Genitourinary: Negative.  Negative for decreased " urine volume, difficulty urinating, dysuria, flank pain, frequency, hematuria, pelvic pain and urgency.   Musculoskeletal: Negative.  Negative for arthralgias, back pain, gait problem, joint swelling, myalgias, neck pain and neck stiffness.   Skin: Negative.  Negative for color change, pallor and rash.   Neurological: Negative.  Negative for dizziness, tremors, seizures, syncope, facial asymmetry, speech difficulty, weakness, light-headedness, numbness and headaches.   Hematological: Negative.  Does not bruise/bleed easily.   Psychiatric/Behavioral: Positive for decreased concentration, dysphoric mood and suicidal ideas. Negative for confusion. The patient is nervous/anxious.    All other systems reviewed and are negative.      Physical Exam     Initial Vitals [11/28/20 2154]   BP Pulse Resp Temp SpO2   (!) 161/81 96 20 98 °F (36.7 °C) 96 %      MAP       --         Physical Exam    Nursing note and vitals reviewed.  Constitutional: She is active and cooperative.  Non-toxic appearance. She does not have a sickly appearance. She does not appear ill. No distress.   HENT:   Head: Normocephalic and atraumatic.   Right Ear: Tympanic membrane normal.   Left Ear: Tympanic membrane normal.   Nose: Nose normal.   Mouth/Throat: Uvula is midline, oropharynx is clear and moist and mucous membranes are normal. No oral lesions. No uvula swelling. No oropharyngeal exudate, posterior oropharyngeal edema or posterior oropharyngeal erythema.   Eyes: Conjunctivae, EOM and lids are normal. Pupils are equal, round, and reactive to light. No scleral icterus.   Neck: Trachea normal, normal range of motion, full passive range of motion without pain and phonation normal. Neck supple. No thyroid mass present. No stridor present. No spinous process tenderness and no muscular tenderness present. No edema, no erythema and normal range of motion present. No neck rigidity. No JVD present.   Cardiovascular: Normal rate, regular rhythm, normal  heart sounds, intact distal pulses and normal pulses. Exam reveals no gallop and no friction rub.    No murmur heard.  Pulmonary/Chest: Effort normal and breath sounds normal. No accessory muscle usage. No tachypnea. No respiratory distress. She has no wheezes. She has no rhonchi. She has no rales.   Abdominal: Soft. Normal appearance and bowel sounds are normal. She exhibits no distension, no pulsatile midline mass and no mass. There is no abdominal tenderness. There is no rigidity, no guarding and no CVA tenderness.   Musculoskeletal: Normal range of motion. No tenderness or edema.      Comments: Pulses are 2+ throughout, cap refill is less than 2 sec throughout, extremities are nontender throughout with full range of motion. There is no spinal tenderness to palpation.   Neurological: She is alert and oriented to person, place, and time. She has normal strength. She displays normal reflexes. No cranial nerve deficit or sensory deficit.   No focal deficits.   Skin: Skin is warm, dry and intact. Capillary refill takes less than 2 seconds. No ecchymosis, no petechiae and no rash noted. No erythema. No pallor.   Psychiatric: Her behavior is normal. Her mood appears anxious. Her speech is tangential. Thought content is paranoid. Thought content is not delusional. Cognition and memory are normal. She expresses impulsivity. She expresses suicidal ideation. She expresses no homicidal ideation. She expresses no suicidal plans and no homicidal plans.         ED Course   Procedures  Labs Reviewed - No data to display       Imaging Results    None          Medical Decision Making:   Clinical Tests:   Lab Tests: Reviewed                        Medically cleared for psychiatry placement: 11/28/2020 10:35 PM                Clinical Impression:     ICD-10-CM ICD-9-CM   1. Suicidal ideation  R45.851 V62.84                          ED Disposition Condition    Transfer to Psych Facility         ED Prescriptions     None         Follow-up Information    None                                      Diya Young MD  11/28/20 [id number]

## 2021-04-26 DIAGNOSIS — Z12.31 SCREENING MAMMOGRAM FOR HIGH-RISK PATIENT: Primary | ICD-10-CM

## 2021-05-05 ENCOUNTER — HOSPITAL ENCOUNTER (OUTPATIENT)
Dept: RADIOLOGY | Facility: HOSPITAL | Age: 41
Discharge: HOME | End: 2021-05-05
Attending: SPECIALIST
Payer: MEDICAID

## 2021-05-05 DIAGNOSIS — Z12.31 SCREENING MAMMOGRAM FOR HIGH-RISK PATIENT: ICD-10-CM

## 2021-05-05 PROCEDURE — 77067 SCR MAMMO BI INCL CAD: CPT | Mod: TC,PO

## 2021-07-29 ENCOUNTER — HOSPITAL ENCOUNTER (OUTPATIENT)
Dept: RADIOLOGY | Facility: HOSPITAL | Age: 41
Discharge: HOME | End: 2021-07-29
Attending: INTERNAL MEDICINE
Payer: MEDICAID

## 2021-07-29 DIAGNOSIS — J44.89 OBSTRUCTIVE CHRONIC BRONCHITIS WITHOUT EXACERBATION: Primary | ICD-10-CM

## 2021-07-29 DIAGNOSIS — J44.89 OBSTRUCTIVE CHRONIC BRONCHITIS WITHOUT EXACERBATION: ICD-10-CM

## 2021-07-29 PROCEDURE — 71046 X-RAY EXAM CHEST 2 VIEWS: CPT | Mod: TC,PO

## 2022-08-01 DIAGNOSIS — Z12.31 ENCOUNTER FOR SCREENING MAMMOGRAM FOR MALIGNANT NEOPLASM OF BREAST: Primary | ICD-10-CM

## 2022-08-02 ENCOUNTER — HOSPITAL ENCOUNTER (OUTPATIENT)
Dept: RADIOLOGY | Facility: HOSPITAL | Age: 42
Discharge: HOME | End: 2022-08-02
Attending: SPECIALIST
Payer: MEDICAID

## 2022-08-02 DIAGNOSIS — Z12.31 ENCOUNTER FOR SCREENING MAMMOGRAM FOR MALIGNANT NEOPLASM OF BREAST: ICD-10-CM

## 2022-08-02 PROCEDURE — 77067 SCR MAMMO BI INCL CAD: CPT | Mod: TC,PO

## 2022-08-02 PROCEDURE — 77063 BREAST TOMOSYNTHESIS BI: CPT | Mod: TC,PO

## 2022-09-02 ENCOUNTER — HOSPITAL ENCOUNTER (EMERGENCY)
Facility: HOSPITAL | Age: 42
Discharge: HOME | End: 2022-09-03
Attending: EMERGENCY MEDICINE
Payer: MEDICAID

## 2022-09-02 DIAGNOSIS — R00.2 PALPITATIONS: ICD-10-CM

## 2022-09-02 DIAGNOSIS — F19.90 SUBSTANCE USE DISORDER: Primary | ICD-10-CM

## 2022-09-02 LAB
ALBUMIN SERPL BCP-MCNC: 4.3 G/DL (ref 3.5–5.2)
ALP SERPL-CCNC: 65 U/L (ref 55–135)
ALT SERPL W/O P-5'-P-CCNC: 25 U/L (ref 10–44)
AMPHET+METHAMPHET UR QL: ABNORMAL
ANION GAP SERPL CALC-SCNC: 12 MMOL/L (ref 8–16)
AST SERPL-CCNC: 28 U/L (ref 10–40)
B-HCG UR QL: NEGATIVE
BARBITURATES UR QL SCN>200 NG/ML: NEGATIVE
BASOPHILS # BLD AUTO: 0.03 K/UL (ref 0–0.2)
BASOPHILS NFR BLD: 0.3 % (ref 0–1.9)
BENZODIAZ UR QL SCN>200 NG/ML: ABNORMAL
BILIRUB SERPL-MCNC: 0.7 MG/DL (ref 0.1–1)
BNP SERPL-MCNC: 18 PG/ML (ref 0–99)
BUN SERPL-MCNC: 8 MG/DL (ref 6–20)
BZE UR QL SCN: ABNORMAL
CALCIUM SERPL-MCNC: 9.7 MG/DL (ref 8.7–10.5)
CANNABINOIDS UR QL SCN: ABNORMAL
CHLORIDE SERPL-SCNC: 99 MMOL/L (ref 95–110)
CO2 SERPL-SCNC: 29 MMOL/L (ref 23–29)
CREAT SERPL-MCNC: 0.7 MG/DL (ref 0.5–1.4)
CREAT UR-MCNC: 339 MG/DL (ref 15–325)
CTP QC/QA: YES
DIFFERENTIAL METHOD: ABNORMAL
EOSINOPHIL # BLD AUTO: 0.1 K/UL (ref 0–0.5)
EOSINOPHIL NFR BLD: 0.7 % (ref 0–8)
ERYTHROCYTE [DISTWIDTH] IN BLOOD BY AUTOMATED COUNT: 15.1 % (ref 11.5–14.5)
EST. GFR  (NO RACE VARIABLE): >60 ML/MIN/1.73 M^2
GLUCOSE SERPL-MCNC: 103 MG/DL (ref 70–110)
HCT VFR BLD AUTO: 45.5 % (ref 37–48.5)
HGB BLD-MCNC: 14.6 G/DL (ref 12–16)
IMM GRANULOCYTES # BLD AUTO: 0.03 K/UL (ref 0–0.04)
IMM GRANULOCYTES NFR BLD AUTO: 0.3 % (ref 0–0.5)
INR PPP: 1.1
LYMPHOCYTES # BLD AUTO: 1.4 K/UL (ref 1–4.8)
LYMPHOCYTES NFR BLD: 14.4 % (ref 18–48)
MCH RBC QN AUTO: 28 PG (ref 27–31)
MCHC RBC AUTO-ENTMCNC: 32.1 G/DL (ref 32–36)
MCV RBC AUTO: 87 FL (ref 82–98)
MONOCYTES # BLD AUTO: 0.6 K/UL (ref 0.3–1)
MONOCYTES NFR BLD: 5.5 % (ref 4–15)
NEUTROPHILS # BLD AUTO: 7.9 K/UL (ref 1.8–7.7)
NEUTROPHILS NFR BLD: 78.8 % (ref 38–73)
NRBC BLD-RTO: 0 /100 WBC
OPIATES UR QL SCN: ABNORMAL
PCP UR QL SCN>25 NG/ML: NEGATIVE
PLATELET # BLD AUTO: 410 K/UL (ref 150–450)
PMV BLD AUTO: 8.8 FL (ref 9.2–12.9)
POTASSIUM SERPL-SCNC: 3.1 MMOL/L (ref 3.5–5.1)
PROT SERPL-MCNC: 8.2 G/DL (ref 6–8.4)
PROTHROMBIN TIME: 13 SEC (ref 11.4–13.7)
RBC # BLD AUTO: 5.22 M/UL (ref 4–5.4)
SARS-COV-2 RDRP RESP QL NAA+PROBE: NEGATIVE
SODIUM SERPL-SCNC: 140 MMOL/L (ref 136–145)
TOXICOLOGY INFORMATION: ABNORMAL
TROPONIN I SERPL DL<=0.01 NG/ML-MCNC: <0.03 NG/ML
WBC # BLD AUTO: 9.98 K/UL (ref 3.9–12.7)

## 2022-09-02 PROCEDURE — 96375 TX/PRO/DX INJ NEW DRUG ADDON: CPT

## 2022-09-02 PROCEDURE — 84484 ASSAY OF TROPONIN QUANT: CPT | Performed by: STUDENT IN AN ORGANIZED HEALTH CARE EDUCATION/TRAINING PROGRAM

## 2022-09-02 PROCEDURE — 93005 ELECTROCARDIOGRAM TRACING: CPT | Performed by: INTERNAL MEDICINE

## 2022-09-02 PROCEDURE — 25000003 PHARM REV CODE 250: Performed by: EMERGENCY MEDICINE

## 2022-09-02 PROCEDURE — 96361 HYDRATE IV INFUSION ADD-ON: CPT

## 2022-09-02 PROCEDURE — 81025 URINE PREGNANCY TEST: CPT | Performed by: EMERGENCY MEDICINE

## 2022-09-02 PROCEDURE — 63600175 PHARM REV CODE 636 W HCPCS: Performed by: EMERGENCY MEDICINE

## 2022-09-02 PROCEDURE — C9113 INJ PANTOPRAZOLE SODIUM, VIA: HCPCS | Performed by: EMERGENCY MEDICINE

## 2022-09-02 PROCEDURE — 80053 COMPREHEN METABOLIC PANEL: CPT | Performed by: STUDENT IN AN ORGANIZED HEALTH CARE EDUCATION/TRAINING PROGRAM

## 2022-09-02 PROCEDURE — 85610 PROTHROMBIN TIME: CPT | Performed by: STUDENT IN AN ORGANIZED HEALTH CARE EDUCATION/TRAINING PROGRAM

## 2022-09-02 PROCEDURE — 99284 EMERGENCY DEPT VISIT MOD MDM: CPT | Mod: 25

## 2022-09-02 PROCEDURE — U0002 COVID-19 LAB TEST NON-CDC: HCPCS | Performed by: EMERGENCY MEDICINE

## 2022-09-02 PROCEDURE — 80307 DRUG TEST PRSMV CHEM ANLYZR: CPT | Performed by: EMERGENCY MEDICINE

## 2022-09-02 PROCEDURE — 85025 COMPLETE CBC W/AUTO DIFF WBC: CPT | Performed by: STUDENT IN AN ORGANIZED HEALTH CARE EDUCATION/TRAINING PROGRAM

## 2022-09-02 PROCEDURE — 93010 ELECTROCARDIOGRAM REPORT: CPT | Mod: ,,, | Performed by: INTERNAL MEDICINE

## 2022-09-02 PROCEDURE — 83880 ASSAY OF NATRIURETIC PEPTIDE: CPT | Performed by: STUDENT IN AN ORGANIZED HEALTH CARE EDUCATION/TRAINING PROGRAM

## 2022-09-02 PROCEDURE — 93010 EKG 12-LEAD: ICD-10-PCS | Mod: ,,, | Performed by: INTERNAL MEDICINE

## 2022-09-02 RX ORDER — ASPIRIN 325 MG
325 TABLET ORAL
Status: COMPLETED | OUTPATIENT
Start: 2022-09-02 | End: 2022-09-02

## 2022-09-02 RX ORDER — PANTOPRAZOLE SODIUM 40 MG/10ML
40 INJECTION, POWDER, LYOPHILIZED, FOR SOLUTION INTRAVENOUS
Status: COMPLETED | OUTPATIENT
Start: 2022-09-02 | End: 2022-09-02

## 2022-09-02 RX ORDER — POTASSIUM CHLORIDE 750 MG/1
50 CAPSULE, EXTENDED RELEASE ORAL ONCE
Status: COMPLETED | OUTPATIENT
Start: 2022-09-02 | End: 2022-09-03

## 2022-09-02 RX ORDER — LORAZEPAM 2 MG/ML
1 INJECTION INTRAMUSCULAR
Status: COMPLETED | OUTPATIENT
Start: 2022-09-02 | End: 2022-09-02

## 2022-09-02 RX ORDER — IBUPROFEN 800 MG/1
800 TABLET ORAL EVERY 8 HOURS PRN
COMMUNITY
Start: 2022-07-22

## 2022-09-02 RX ORDER — ALBUTEROL SULFATE 90 UG/1
2 AEROSOL, METERED RESPIRATORY (INHALATION) 2 TIMES DAILY
COMMUNITY
Start: 2022-09-01

## 2022-09-02 RX ORDER — AMOXICILLIN 500 MG/1
CAPSULE ORAL
COMMUNITY
Start: 2022-07-22

## 2022-09-02 RX ORDER — HYDROCODONE BITARTRATE AND ACETAMINOPHEN 5; 325 MG/1; MG/1
1 TABLET ORAL EVERY 6 HOURS PRN
COMMUNITY
Start: 2022-07-22

## 2022-09-02 RX ORDER — MAGNESIUM SULFATE HEPTAHYDRATE 40 MG/ML
2 INJECTION, SOLUTION INTRAVENOUS ONCE
Status: COMPLETED | OUTPATIENT
Start: 2022-09-02 | End: 2022-09-03

## 2022-09-02 RX ADMIN — LORAZEPAM 1 MG: 2 INJECTION INTRAMUSCULAR; INTRAVENOUS at 09:09

## 2022-09-02 RX ADMIN — PANTOPRAZOLE SODIUM 40 MG: 40 INJECTION, POWDER, FOR SOLUTION INTRAVENOUS at 08:09

## 2022-09-02 RX ADMIN — ASPIRIN 325 MG ORAL TABLET 325 MG: 325 PILL ORAL at 08:09

## 2022-09-02 RX ADMIN — SODIUM CHLORIDE, SODIUM LACTATE, POTASSIUM CHLORIDE, AND CALCIUM CHLORIDE 1000 ML: .6; .31; .03; .02 INJECTION, SOLUTION INTRAVENOUS at 08:09

## 2022-09-03 VITALS
DIASTOLIC BLOOD PRESSURE: 65 MMHG | TEMPERATURE: 98 F | HEART RATE: 77 BPM | BODY MASS INDEX: 40.18 KG/M2 | WEIGHT: 250 LBS | SYSTOLIC BLOOD PRESSURE: 126 MMHG | HEIGHT: 66 IN | OXYGEN SATURATION: 97 % | RESPIRATION RATE: 17 BRPM

## 2022-09-03 PROCEDURE — 96365 THER/PROPH/DIAG IV INF INIT: CPT

## 2022-09-03 PROCEDURE — 96366 THER/PROPH/DIAG IV INF ADDON: CPT

## 2022-09-03 PROCEDURE — 63600175 PHARM REV CODE 636 W HCPCS: Performed by: EMERGENCY MEDICINE

## 2022-09-03 PROCEDURE — 25000003 PHARM REV CODE 250: Performed by: EMERGENCY MEDICINE

## 2022-09-03 RX ADMIN — SODIUM CHLORIDE, SODIUM LACTATE, POTASSIUM CHLORIDE, AND CALCIUM CHLORIDE 1000 ML: .6; .31; .03; .02 INJECTION, SOLUTION INTRAVENOUS at 12:09

## 2022-09-03 RX ADMIN — POTASSIUM CHLORIDE 50 MEQ: 750 CAPSULE, EXTENDED RELEASE ORAL at 12:09

## 2022-09-03 RX ADMIN — MAGNESIUM SULFATE HEPTAHYDRATE 2 G: 40 INJECTION, SOLUTION INTRAVENOUS at 12:09

## 2022-09-03 NOTE — ED NOTES
"PT STATES, "I TOOK SOME DRUGS THE OTHER DAY. I WANT TO TRY TO STAY CLEAN. I STARTED HAVING HEART PALPITATIONS." GCS 15. DENIES ANY SI OR HI. DENIES ANY PAIN RIGHT NOW. NO ACUTE DISTRESS NOTED. STABLE CONDITION.   "
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
## 2022-09-20 ENCOUNTER — PATIENT OUTREACH (OUTPATIENT)
Dept: EMERGENCY MEDICINE | Facility: HOSPITAL | Age: 42
End: 2022-09-20

## 2022-09-20 NOTE — PROGRESS NOTES
Belkis Barnes  ED Navigator  Emergency Department    Project: [facility] ED Navigator  Role: Community Health Worker    Date: 09/20/2022  Patient Name: Monica South  MRN: [id number]  PCP: Ace Olivera MD    Assessment:     Monica South is a 42 y.o. female who has presented to ED for heart palpitations. Patient has visited the ED 2 times in the past 3 months. Patient did not contact PCP.     ED Navigator Initial Assessment    ED Navigator Enrollment Documentation  Consent to Services  Does patient consent to completing the assessment?: Yes  Contact  Method of Initial Contact: Phone  Transportation  Does the patient have issues with Transportation?: No  Does the patient have transportation to and from healthcare appointments?: Yes  Insurance Coverage  Do you have coverage/adequate coverage?: Yes  Type/kind of coverage: Medicaid  Is patient able to afford co-pays/deductibles?: Yes  Is patient able to afford HME or supplies?: Yes  Does patient have an established [facility] PCP?: Yes  Able to access?: Yes  Does the patient have a lack of adequate coverage?: No  Specialist Appointment  Did the patient come to the ED to see a specialist?: No  Does the patient have a pending specialist referral?: No  Does the patient have a specialist appointment made?: No  PCP Follow Up Appointment  Has the patient had an appointment with a primary care provider in the past year?: No  Does the patient have a follow up appontment with a PCP?: No  When was the last time you saw your PCP?: 6/20/22  Why does the patient not have a follow up scheduled?: Other (see comments) (Comment: she advised that she is contacting her PCP to schedule)  Medications  Is patient able to afford medication?: Yes  Is patient unable to get medication due to lack of transportation?: No  Psychological  Does the patient have psycho-social concerns?: No  Food  Does the patient have concerns about food?: No  Communication/Education  Does the patient have limited English  proficiency/English not primary language?: No  Does patient have low literacy and/or low health literacy?: Yes  Does patient have concerns with care?: No  Does patient have dissatisfaction with care?: No  Other Financial Concerns  Does the patient have immediate financial distress?: No  Does the patient have general financial concerns?: No  Other Social Barriers/Concerns  Does the patient have any additional barriers or concerns?: None  Primary Barrier  Barriers identified: Cognitive barrier (health literacy, language and communication, etc.)  Root Cause of ED Utilization: Patient Knowledge/Low Health Literacy  Plan to address Patient Knowledge/Low Health Literacy: Provided information for [facility] 24/7 Nurse triage line [phone] or 1-866-[facility] ([phone])  Next steps: Provided Education  Was education/educational materials provided surrounding PCP services/creating a medical home?: Yes Was education verbal or written?: Verbal     Was education/educational materials provided surrounding low cost, healthy foods?: Yes Was education verbal or written?: Verbal     Was education/educational materials provided surrounding other items? If so, use comment to explain.: No    Plan: Provided information for [facility] 24/7 Nurse triage line, [phone] or 1-866-[facility] ([phone])  Expected Date of Follow Up 1: 10/4/22  Additional Documentation: ED Navigator spoke with patient on phone and completed assessment. Patient denied any concerns with housing, utilities, food or transportation. Patient stated they needed to follow up to schedule appointment with PCP. I encouraged her to follow up and advised that I would call to check in on her and her progress. She agreed. The following resources were provided [facility] brochure, Healthy Heart Diet information, and [facility] 24/7 Nurse Triage line.           Social History     Socioeconomic History    Marital status: Single   Tobacco Use    Smoking  status: Every Day     Packs/day: 1.00     Types: Cigarettes   Substance and Sexual Activity    Alcohol use: Not Currently    Drug use: Yes     Types: Cocaine     Comment: a few days ago     Social Determinants of Health     Financial Resource Strain: Low Risk     Difficulty of Paying Living Expenses: Not very hard   Food Insecurity: No Food Insecurity    Worried About Running Out of Food in the Last Year: Never true    Ran Out of Food in the Last Year: Never true   Transportation Needs: No Transportation Needs    Lack of Transportation (Medical): No    Lack of Transportation (Non-Medical): No   Physical Activity: Insufficiently Active    Days of Exercise per Week: 4 days    Minutes of Exercise per Session: 30 min   Stress: Stress Concern Present    Feeling of Stress : To some extent   Social Connections: Socially Isolated    Frequency of Communication with Friends and Family: Three times a week    Frequency of Social Gatherings with Friends and Family: Twice a week    Attends [employer] Services: Never    Active Member of Clubs or Organizations: No    Attends Club or Organization Meetings: Never    Marital Status: Never    Housing Stability: Low Risk     Unable to Pay for Housing in the Last Year: No    Number of Places Lived in the Last Year: 1    Unstable Housing in the Last Year: No       Plan:     ED Navigator spoke with patient on phone and completed assessment. Patient denied any concerns with housing, utilities, food or transportation. Patient stated they needed to follow up to schedule appointment with PCP. I encouraged her to follow up and advised that I would call to check in on her and her progress. She agreed. The following resources were provided [facility] brochure, Healthy Heart Diet information, and [facility] 24/7 Nurse Triage line.

## 2024-03-22 NOTE — PROGRESS NOTES
PT/PTA face to face conference completed regarding patient treatment plan and progress towards established goals. Treatment will be continued as described in initial report/ evaluation and treatment/progress notes. Patient will be seen by physical therapist every sixth visit or minimally once per month.     
Frequent UTI